# Patient Record
Sex: FEMALE | Race: WHITE | Employment: FULL TIME
[De-identification: names, ages, dates, MRNs, and addresses within clinical notes are randomized per-mention and may not be internally consistent; named-entity substitution may affect disease eponyms.]

---

## 2017-09-03 ENCOUNTER — HEALTH MAINTENANCE LETTER (OUTPATIENT)
Age: 37
End: 2017-09-03

## 2018-02-28 ENCOUNTER — COMMUNICATION - HEALTHEAST (OUTPATIENT)
Dept: BEHAVIORAL HEALTH | Facility: CLINIC | Age: 38
End: 2018-02-28

## 2018-02-28 DIAGNOSIS — F33.2 MAJOR DEPRESSIVE DISORDER, RECURRENT SEVERE WITHOUT PSYCHOTIC FEATURES (H): ICD-10-CM

## 2018-10-01 ENCOUNTER — COMMUNICATION - HEALTHEAST (OUTPATIENT)
Dept: BEHAVIORAL HEALTH | Facility: CLINIC | Age: 38
End: 2018-10-01

## 2018-10-01 DIAGNOSIS — F33.2 MAJOR DEPRESSIVE DISORDER, RECURRENT SEVERE WITHOUT PSYCHOTIC FEATURES (H): ICD-10-CM

## 2019-12-12 ENCOUNTER — APPOINTMENT (OUTPATIENT)
Dept: GENERAL RADIOLOGY | Facility: CLINIC | Age: 39
End: 2019-12-12
Attending: EMERGENCY MEDICINE
Payer: COMMERCIAL

## 2019-12-12 ENCOUNTER — HOSPITAL ENCOUNTER (EMERGENCY)
Facility: CLINIC | Age: 39
Discharge: NURSING, REHAB OR LONG-TERM CARE | End: 2019-12-13
Attending: EMERGENCY MEDICINE | Admitting: EMERGENCY MEDICINE
Payer: COMMERCIAL

## 2019-12-12 DIAGNOSIS — R07.9 ACUTE CHEST PAIN: ICD-10-CM

## 2019-12-12 LAB
ANION GAP SERPL CALCULATED.3IONS-SCNC: 5 MMOL/L (ref 3–14)
BASOPHILS # BLD AUTO: 0 10E9/L (ref 0–0.2)
BASOPHILS NFR BLD AUTO: 0.5 %
BUN SERPL-MCNC: 12 MG/DL (ref 7–30)
CALCIUM SERPL-MCNC: 8.6 MG/DL (ref 8.5–10.1)
CHLORIDE SERPL-SCNC: 108 MMOL/L (ref 94–109)
CO2 SERPL-SCNC: 26 MMOL/L (ref 20–32)
CREAT SERPL-MCNC: 0.94 MG/DL (ref 0.52–1.04)
DIFFERENTIAL METHOD BLD: NORMAL
EOSINOPHIL # BLD AUTO: 0.1 10E9/L (ref 0–0.7)
EOSINOPHIL NFR BLD AUTO: 1.8 %
ERYTHROCYTE [DISTWIDTH] IN BLOOD BY AUTOMATED COUNT: 12.6 % (ref 10–15)
GFR SERPL CREATININE-BSD FRML MDRD: 76 ML/MIN/{1.73_M2}
GLUCOSE SERPL-MCNC: 92 MG/DL (ref 70–99)
HCT VFR BLD AUTO: 42.7 % (ref 35–47)
HGB BLD-MCNC: 13.6 G/DL (ref 11.7–15.7)
IMM GRANULOCYTES # BLD: 0 10E9/L (ref 0–0.4)
IMM GRANULOCYTES NFR BLD: 0.1 %
LYMPHOCYTES # BLD AUTO: 2.1 10E9/L (ref 0.8–5.3)
LYMPHOCYTES NFR BLD AUTO: 26.9 %
MCH RBC QN AUTO: 29 PG (ref 26.5–33)
MCHC RBC AUTO-ENTMCNC: 31.9 G/DL (ref 31.5–36.5)
MCV RBC AUTO: 91 FL (ref 78–100)
MONOCYTES # BLD AUTO: 0.8 10E9/L (ref 0–1.3)
MONOCYTES NFR BLD AUTO: 10.2 %
NEUTROPHILS # BLD AUTO: 4.6 10E9/L (ref 1.6–8.3)
NEUTROPHILS NFR BLD AUTO: 60.5 %
NRBC # BLD AUTO: 0 10*3/UL
NRBC BLD AUTO-RTO: 0 /100
PLATELET # BLD AUTO: 259 10E9/L (ref 150–450)
POTASSIUM SERPL-SCNC: 4.3 MMOL/L (ref 3.4–5.3)
RBC # BLD AUTO: 4.69 10E12/L (ref 3.8–5.2)
SODIUM SERPL-SCNC: 138 MMOL/L (ref 133–144)
TROPONIN I SERPL-MCNC: <0.015 UG/L (ref 0–0.04)
WBC # BLD AUTO: 7.7 10E9/L (ref 4–11)

## 2019-12-12 PROCEDURE — 85025 COMPLETE CBC W/AUTO DIFF WBC: CPT | Performed by: EMERGENCY MEDICINE

## 2019-12-12 PROCEDURE — 84484 ASSAY OF TROPONIN QUANT: CPT | Performed by: EMERGENCY MEDICINE

## 2019-12-12 PROCEDURE — 93005 ELECTROCARDIOGRAM TRACING: CPT | Performed by: EMERGENCY MEDICINE

## 2019-12-12 PROCEDURE — 99285 EMERGENCY DEPT VISIT HI MDM: CPT | Mod: 25 | Performed by: EMERGENCY MEDICINE

## 2019-12-12 PROCEDURE — 93010 ELECTROCARDIOGRAM REPORT: CPT | Mod: Z6 | Performed by: EMERGENCY MEDICINE

## 2019-12-12 PROCEDURE — 80048 BASIC METABOLIC PNL TOTAL CA: CPT | Performed by: EMERGENCY MEDICINE

## 2019-12-12 PROCEDURE — 71046 X-RAY EXAM CHEST 2 VIEWS: CPT

## 2019-12-12 PROCEDURE — 96374 THER/PROPH/DIAG INJ IV PUSH: CPT | Performed by: EMERGENCY MEDICINE

## 2019-12-12 PROCEDURE — 25000128 H RX IP 250 OP 636: Performed by: EMERGENCY MEDICINE

## 2019-12-12 RX ORDER — IBUPROFEN 600 MG/1
600 TABLET, FILM COATED ORAL EVERY 6 HOURS PRN
Qty: 30 TABLET | Refills: 0 | Status: SHIPPED | OUTPATIENT
Start: 2019-12-12 | End: 2020-01-23

## 2019-12-12 RX ORDER — KETOROLAC TROMETHAMINE 30 MG/ML
30 INJECTION, SOLUTION INTRAMUSCULAR; INTRAVENOUS ONCE
Status: COMPLETED | OUTPATIENT
Start: 2019-12-12 | End: 2019-12-12

## 2019-12-12 RX ORDER — CYCLOBENZAPRINE HCL 10 MG
10 TABLET ORAL 3 TIMES DAILY PRN
Qty: 20 TABLET | Refills: 0 | Status: SHIPPED | OUTPATIENT
Start: 2019-12-12 | End: 2020-01-23

## 2019-12-12 RX ADMIN — KETOROLAC TROMETHAMINE 30 MG: 30 INJECTION, SOLUTION INTRAMUSCULAR; INTRAVENOUS at 23:12

## 2019-12-12 NOTE — ED AVS SNAPSHOT
[facility], [place], Emergency Department  [address] [address]  Phone:  [phone]                                    Kelly Beth Behlen   MRN: [id number]    Department:  [facility], Emergency Department   Date of Visit:  12/12/2019           After Visit Summary Signature Page    I have received my discharge instructions, and my questions have been answered. I have discussed any challenges I see with this plan with the nurse or doctor.    ..........................................................................................................................................  Patient/Patient Representative Signature      ..........................................................................................................................................  Patient Representative Print Name and Relationship to Patient    ..................................................               ................................................  Date                                   Time    ..........................................................................................................................................  Reviewed by Signature/Title    ...................................................              ..............................................  Date                                               Time          22EPIC Rev 08/18

## 2019-12-13 VITALS
DIASTOLIC BLOOD PRESSURE: 67 MMHG | OXYGEN SATURATION: 98 % | HEART RATE: 62 BPM | SYSTOLIC BLOOD PRESSURE: 116 MMHG | RESPIRATION RATE: 13 BRPM | TEMPERATURE: 98.1 F

## 2019-12-13 LAB
INTERPRETATION ECG - MUSE: NORMAL
TROPONIN I SERPL-MCNC: <0.015 UG/L (ref 0–0.04)

## 2019-12-13 NOTE — ED PROVIDER NOTES
"      [place] EMERGENCY DEPARTMENT ([facility])  December 12, 2019    History     Chief Complaint   Patient presents with     Chest Pain     HPI  Kelly Beth Behlen is a 39 year old female with past medical history of anxiety and depression who presents the Emergency Department with chief complaint of intermittent chest pain that started within the last hour.  It is in the midsternal region.  It is described as a pressure.  It is 4 out of 10 in severity.  Patient states the pain gets \"two times worse\" when she stands up.  It radiates into her jaw and clavicle on the R.  She denies pain to the touch or with deep breaths. Patient denies previous history of heart problems or DVT/PE. She is unsure of family history of heart problems. She notes she had an EKG on 12/2/19 before starting [facility] (she had been restricting her calorie intake to 600 calories per day for the past 2.5 months), at which time she was told she has a prolonged QT. She denies swelling in her bilateral lower extremities.  Patient reports she has a cough that is associated with her chronic asthma. She notes she may have bronchitis and also has been diagnosed with left lower lobe atelectasis in the past. Patient does not smoke cigarettes.   No known family history of coronary artery disease, although the patient states she is not fully aware of her family medical history.  She denies a personal history of hypertension, hyperlipidemia, diabetes, or cardiac disease.  Denies leg pain or swelling.    I have reviewed the Medications, Allergies, Past Medical and Surgical History, and Social History in the [employer] system.  Past Medical History:   Diagnosis Date     Anxiety      Depressive disorder      History reviewed. No pertinent surgical history.  No current facility-administered medications for this encounter.      Current Outpatient Medications   Medication     ESTRADIOL PO     FLUoxetine (PROZAC) 20 MG capsule     OLANZapine (ZYPREXA) 10 " MG tablet     Progesterone Micronized (PROMETRIUM PO)     SPIRONOLACTONE PO      No Known Allergies  Social History     Socioeconomic History     Marital status:      Spouse name: Not on file     Number of children: Not on file     Years of education: Not on file     Highest education level: Not on file   Occupational History     Not on file   Social Needs     Financial resource strain: Not on file     Food insecurity:     Worry: Not on file     Inability: Not on file     Transportation needs:     Medical: Not on file     Non-medical: Not on file   Tobacco Use     Smoking status: Never Smoker   Substance and Sexual Activity     Alcohol use: No     Drug use: Not on file     Sexual activity: Not on file   Lifestyle     Physical activity:     Days per week: Not on file     Minutes per session: Not on file     Stress: Not on file   Relationships     Social connections:     Talks on phone: Not on file     Gets together: Not on file     Attends Spiritism service: Not on file     Active member of club or organization: Not on file     Attends meetings of clubs or organizations: Not on file     Relationship status: Not on file     Intimate partner violence:     Fear of current or ex partner: Not on file     Emotionally abused: Not on file     Physically abused: Not on file     Forced sexual activity: Not on file   Other Topics Concern     Not on file   Social History Narrative     Not on file       Review of Systems  General: No fevers or chills  Skin: No rash or diaphoresis  Eyes: No eye redness or discharge  Ears/Nose/Throat: No rhinorrhea or nasal congestion  Respiratory: No cough or SOB  Cardiovascular: No chest pain or palpitations  Gastrointestinal: No nausea, vomiting, or diarrhea  Genitourinary: No urinary frequency, hematuria, or dysuria  Musculoskeletal: No arthralgias or myalgias  Neurologic: No numbness or weakness  Psychiatric: No depression or SI  Hematologic/Lymphatic/Immunologic: No leg swelling, no  easy bruising/bleeding  Endocrine: No polyuria/polydypsia      Physical Exam   BP: 128/67  Pulse: 69  SpO2: 95 %      Physical Exam  General: Well nourished, well developed, NAD  HEENT: EOMI, anicteric. NCAT, MMM  Neck: no jugular venous distension, supple, nl ROM  Cardiac: Regular rate and rhythm. No murmurs, rubs, or gallops. Normal S1, S2.  Intact peripheral pulses  Pulm: CTAB, no stridor, wheezes, rales, rhonchi  Abd: Soft, obese, nontender, nondistended.  No masses palpated.    Skin: Warm and dry to the touch.  No rash  Extremities: No LE edema, no cyanosis, w/w/p, no posterior calf tenderness  Neuro: A&Ox3, no gross focal deficits        ED Course        Procedures             EKG Interpretation:      Interpreted by Aisha Davis MD  Time reviewed: 2114  Symptoms at time of EKG: Chest pain  Rhythm: normal sinus   Rate: normal  Axis: normal  Ectopy: none  Conduction: normal  ST Segments/ T Waves: No ST-T wave changes  Q Waves: none  Comparison to prior: No old EKG available    Clinical Impression: normal EKG          Critical Care time:  none             Labs Ordered and Resulted from Time of ED Arrival Up to the Time of Departure from the ED   CBC WITH PLATELETS DIFFERENTIAL   BASIC METABOLIC PANEL   TROPONIN I   TROPONIN I   CARDIAC CONTINUOUS MONITORING          Results for orders placed or performed during the hospital encounter of 12/12/19 (from the past 24 hour(s))   EKG 12-lead, tracing only   Result Value Ref Range    Interpretation ECG Click View Image link to view waveform and result    XR Chest 2 Views    Narrative    Exam: XR CHEST 2 VW, 12/12/2019 9:23 PM    Indication: soa    Comparison: None    Findings:   PA and lateral views the chest. Cardiac silhouette and pulmonary  vasculature are within normal limits. No pneumothorax or pleural  effusion. No focal airspace opacities. No acute bony abnormalities.  The upper abdomen is unremarkable. Cholecystectomy surgical clips  project over the  right upper quadrant.      Impression    Impression:   No acute airspace disease.    I have personally reviewed the examination and initial interpretation  and I agree with the findings.    DEON FLYNN MD   CBC with platelets differential   Result Value Ref Range    WBC 7.7 4.0 - 11.0 10e9/L    RBC Count 4.69 3.8 - 5.2 10e12/L    Hemoglobin 13.6 11.7 - 15.7 g/dL    Hematocrit 42.7 35.0 - 47.0 %    MCV 91 78 - 100 fl    MCH 29.0 26.5 - 33.0 pg    MCHC 31.9 31.5 - 36.5 g/dL    RDW 12.6 10.0 - 15.0 %    Platelet Count 259 150 - 450 10e9/L    Diff Method Automated Method     % Neutrophils 60.5 %    % Lymphocytes 26.9 %    % Monocytes 10.2 %    % Eosinophils 1.8 %    % Basophils 0.5 %    % Immature Granulocytes 0.1 %    Nucleated RBCs 0 0 /100    Absolute Neutrophil 4.6 1.6 - 8.3 10e9/L    Absolute Lymphocytes 2.1 0.8 - 5.3 10e9/L    Absolute Monocytes 0.8 0.0 - 1.3 10e9/L    Absolute Eosinophils 0.1 0.0 - 0.7 10e9/L    Absolute Basophils 0.0 0.0 - 0.2 10e9/L    Abs Immature Granulocytes 0.0 0 - 0.4 10e9/L    Absolute Nucleated RBC 0.0    Basic metabolic panel   Result Value Ref Range    Sodium 138 133 - 144 mmol/L    Potassium 4.3 3.4 - 5.3 mmol/L    Chloride 108 94 - 109 mmol/L    Carbon Dioxide 26 20 - 32 mmol/L    Anion Gap 5 3 - 14 mmol/L    Glucose 92 70 - 99 mg/dL    Urea Nitrogen 12 7 - 30 mg/dL    Creatinine 0.94 0.52 - 1.04 mg/dL    GFR Estimate 76 >60 mL/min/[1.73_m2]    GFR Estimate If Black 88 >60 mL/min/[1.73_m2]    Calcium 8.6 8.5 - 10.1 mg/dL   Troponin I   Result Value Ref Range    Troponin I ES <0.015 0.000 - 0.045 ug/L   Troponin I (second draw)   Result Value Ref Range    Troponin I ES <0.015 0.000 - 0.045 ug/L       Labs, vital signs, and imaging studies were reviewed by me.    Assessments & Plan (with Medical Decision Making)   Patient is a 39-year-old female presenting with chest pain.  Differential diagnosis includes chest wall pain/costochondritis, pneumonia, bronchitis.  ACS is less  likely given heart score of 2 (moderately suspicious story, one risk factor including obesity), however EKG and troponin levels ordered to further evaluate for this.  PE is unlikely as patient is PERC negative.    Labs are within normal limits including troponin x2.  Chest x-ray is negative and EKG is unremarkable    I have reviewed the nursing notes.    I have reviewed the findings, diagnosis, plan and need for follow up with the patient.  Patient to be discharged home. Advised to follow up with PCP within 1 week. To return to ER immediately with any new/worsening symptoms. Plan of care discussed with patient who expresses understanding and agrees with plan of care.  Patient provided with Flexeril and ibuprofen for pain control at home.      New Prescriptions    CYCLOBENZAPRINE (FLEXERIL) 10 MG TABLET    Take 1 tablet (10 mg) by mouth 3 times daily as needed for muscle spasms    IBUPROFEN (ADVIL/MOTRIN) 600 MG TABLET    Take 1 tablet (600 mg) by mouth every 6 hours as needed       Final diagnoses:   Acute chest pain     ICorina, am serving as a trained medical scribe to document services personally performed by Aisha Davis MD, based on the provider's statements to me.      IAisha MD, was physically present and have reviewed and verified the accuracy of this note documented by Corina Irizarry.       12/12/2019   [facility], [place], EMERGENCY DEPARTMENT     Aisha Davis MD  12/13/19 0043

## 2019-12-13 NOTE — ED NOTES
Bed: ED23  Expected date:   Expected time:   Means of arrival: Ambulance  Comments:  SP23   39F with chest pain from the [facility] [facility]

## 2019-12-13 NOTE — DISCHARGE INSTRUCTIONS
TODAY'S VISIT:  You were seen today for chest pain  -   - If you had any labs or imaging/radiology tests performed today, you should also discuss these tests with your usual provider.     FOLLOW-UP:  Please make an appointment to follow up with:  - Your Primary Care Provider. If you do not have a PCP, please call the Primary Care Center (phone: [phone] for an appointment    - Have your provider review the results from today's visit with you again to make sure no further follow-up or additional testing is needed based on those results.     PRESCRIPTIONS / MEDICATIONS:  - ibuprofen  - flexeril    RETURN TO THE EMERGENCY DEPARTMENT  Return to the Emergency Department at any time for any new or worsening symptoms or any concerns.

## 2019-12-13 NOTE — ED TRIAGE NOTES
Per spF PATIENT FROM [facility] midsternal radiating pressure 4/10 worse with movement radiating into jaw and clavicle. Patient states it started within last hour

## 2020-01-23 ENCOUNTER — TELEPHONE (OUTPATIENT)
Dept: BEHAVIORAL HEALTH | Facility: CLINIC | Age: 40
End: 2020-01-23

## 2020-01-23 ENCOUNTER — HOSPITAL ENCOUNTER (INPATIENT)
Facility: CLINIC | Age: 40
LOS: 4 days | Discharge: HOME | DRG: 883 | End: 2020-01-27
Attending: EMERGENCY MEDICINE | Admitting: PSYCHIATRY & NEUROLOGY
Payer: COMMERCIAL

## 2020-01-23 DIAGNOSIS — F33.9 EPISODE OF RECURRENT MAJOR DEPRESSIVE DISORDER, UNSPECIFIED DEPRESSION EPISODE SEVERITY (H): ICD-10-CM

## 2020-01-23 DIAGNOSIS — F32.A DEPRESSION, UNSPECIFIED DEPRESSION TYPE: ICD-10-CM

## 2020-01-23 DIAGNOSIS — F41.9 ANXIETY: ICD-10-CM

## 2020-01-23 DIAGNOSIS — R45.851 SUICIDAL IDEATION: ICD-10-CM

## 2020-01-23 DIAGNOSIS — E55.9 VITAMIN D DEFICIENCY: Primary | ICD-10-CM

## 2020-01-23 DIAGNOSIS — Z78.9 TRANSGENDER: ICD-10-CM

## 2020-01-23 LAB
AMPHETAMINES UR QL SCN: NEGATIVE
BARBITURATES UR QL: NEGATIVE
BENZODIAZ UR QL: NEGATIVE
CANNABINOIDS UR QL SCN: NEGATIVE
COCAINE UR QL: NEGATIVE
ETHANOL UR QL SCN: NEGATIVE
OPIATES UR QL SCN: NEGATIVE

## 2020-01-23 PROCEDURE — 99285 EMERGENCY DEPT VISIT HI MDM: CPT | Mod: 25 | Performed by: EMERGENCY MEDICINE

## 2020-01-23 PROCEDURE — 90791 PSYCH DIAGNOSTIC EVALUATION: CPT

## 2020-01-23 PROCEDURE — 80307 DRUG TEST PRSMV CHEM ANLYZR: CPT | Performed by: FAMILY MEDICINE

## 2020-01-23 PROCEDURE — 80320 DRUG SCREEN QUANTALCOHOLS: CPT | Performed by: FAMILY MEDICINE

## 2020-01-23 PROCEDURE — 25000132 ZZH RX MED GY IP 250 OP 250 PS 637: Performed by: STUDENT IN AN ORGANIZED HEALTH CARE EDUCATION/TRAINING PROGRAM

## 2020-01-23 PROCEDURE — 12400001 ZZH R&B MH UMMC

## 2020-01-23 PROCEDURE — 90853 GROUP PSYCHOTHERAPY: CPT

## 2020-01-23 PROCEDURE — 99285 EMERGENCY DEPT VISIT HI MDM: CPT | Mod: Z6 | Performed by: EMERGENCY MEDICINE

## 2020-01-23 PROCEDURE — 25000132 ZZH RX MED GY IP 250 OP 250 PS 637: Performed by: FAMILY MEDICINE

## 2020-01-23 RX ORDER — ESCITALOPRAM OXALATE 10 MG/1
30 TABLET ORAL DAILY
Status: ON HOLD | COMMUNITY
Start: 2019-07-08 | End: 2020-01-27

## 2020-01-23 RX ORDER — TRAZODONE HYDROCHLORIDE 50 MG/1
50 TABLET, FILM COATED ORAL
Status: DISCONTINUED | OUTPATIENT
Start: 2020-01-23 | End: 2020-01-27 | Stop reason: HOSPADM

## 2020-01-23 RX ORDER — OLANZAPINE 10 MG/1
10 TABLET ORAL
Status: DISCONTINUED | OUTPATIENT
Start: 2020-01-23 | End: 2020-01-27 | Stop reason: HOSPADM

## 2020-01-23 RX ORDER — LITHIUM CARBONATE 300 MG/1
1200 TABLET, FILM COATED, EXTENDED RELEASE ORAL AT BEDTIME
Status: ON HOLD | COMMUNITY
Start: 2019-09-16 | End: 2020-01-27

## 2020-01-23 RX ORDER — SPIRONOLACTONE 100 MG/1
200 TABLET, FILM COATED ORAL DAILY
Status: DISCONTINUED | OUTPATIENT
Start: 2020-01-23 | End: 2020-01-27 | Stop reason: HOSPADM

## 2020-01-23 RX ORDER — ESTRADIOL 2 MG/1
2 TABLET ORAL 3 TIMES DAILY
Status: DISCONTINUED | OUTPATIENT
Start: 2020-01-23 | End: 2020-01-27 | Stop reason: HOSPADM

## 2020-01-23 RX ORDER — CLONAZEPAM 0.5 MG/1
0.75 TABLET ORAL 3 TIMES DAILY
Status: ON HOLD | COMMUNITY
End: 2020-01-27

## 2020-01-23 RX ORDER — CLONAZEPAM 0.5 MG/1
0.75 TABLET ORAL 3 TIMES DAILY
COMMUNITY
Start: 2019-07-08 | End: 2020-01-23

## 2020-01-23 RX ORDER — ACETAMINOPHEN 325 MG/1
650 TABLET ORAL EVERY 4 HOURS PRN
Status: DISCONTINUED | OUTPATIENT
Start: 2020-01-23 | End: 2020-01-27 | Stop reason: HOSPADM

## 2020-01-23 RX ORDER — MULTIVITAMIN,THERAPEUTIC
1 TABLET ORAL DAILY
Status: DISCONTINUED | OUTPATIENT
Start: 2020-01-23 | End: 2020-01-24

## 2020-01-23 RX ORDER — MULTIPLE VITAMINS W/ MINERALS TAB 9MG-400MCG
1 TAB ORAL DAILY
COMMUNITY
End: 2020-12-27

## 2020-01-23 RX ORDER — HYDROXYZINE HYDROCHLORIDE 25 MG/1
25 TABLET, FILM COATED ORAL EVERY 4 HOURS PRN
Status: DISCONTINUED | OUTPATIENT
Start: 2020-01-23 | End: 2020-01-27 | Stop reason: HOSPADM

## 2020-01-23 RX ORDER — METAXALONE 800 MG/1
800 TABLET ORAL 3 TIMES DAILY PRN
Status: DISCONTINUED | OUTPATIENT
Start: 2020-01-23 | End: 2020-01-27 | Stop reason: HOSPADM

## 2020-01-23 RX ORDER — ERGOCALCIFEROL 1.25 MG/1
50000 CAPSULE, LIQUID FILLED ORAL WEEKLY
Status: ON HOLD | COMMUNITY
End: 2020-01-27

## 2020-01-23 RX ORDER — OLANZAPINE 10 MG/2ML
10 INJECTION, POWDER, FOR SOLUTION INTRAMUSCULAR
Status: DISCONTINUED | OUTPATIENT
Start: 2020-01-23 | End: 2020-01-27 | Stop reason: HOSPADM

## 2020-01-23 RX ORDER — METAXALONE 800 MG/1
800 TABLET ORAL 3 TIMES DAILY PRN
COMMUNITY

## 2020-01-23 RX ORDER — ACETAMINOPHEN 325 MG/1
975 TABLET ORAL ONCE
Status: COMPLETED | OUTPATIENT
Start: 2020-01-23 | End: 2020-01-23

## 2020-01-23 RX ORDER — LIDOCAINE 4 G/G
1-3 PATCH TOPICAL DAILY PRN
COMMUNITY

## 2020-01-23 RX ADMIN — THERA TABS 1 TABLET: TAB at 21:29

## 2020-01-23 RX ADMIN — Medication 0.75 MG: at 21:28

## 2020-01-23 RX ADMIN — SPIRONOLACTONE 200 MG: 100 TABLET ORAL at 21:29

## 2020-01-23 RX ADMIN — PROGESTERONE 100 MG: 100 CAPSULE ORAL at 21:29

## 2020-01-23 RX ADMIN — ACETAMINOPHEN 975 MG: 325 TABLET, FILM COATED ORAL at 12:51

## 2020-01-23 RX ADMIN — LITHIUM CARBONATE 1200 MG: 450 TABLET, EXTENDED RELEASE ORAL at 21:29

## 2020-01-23 RX ADMIN — ESTRADIOL 2 MG: 2 TABLET ORAL at 21:29

## 2020-01-23 ASSESSMENT — ACTIVITIES OF DAILY LIVING (ADL)
LAUNDRY: WITH SUPERVISION
DRESS: INDEPENDENT
HYGIENE/GROOMING: INDEPENDENT
ORAL_HYGIENE: INDEPENDENT

## 2020-01-23 ASSESSMENT — ENCOUNTER SYMPTOMS
NECK STIFFNESS: 0
FATIGUE: 1
SORE THROAT: 0
CONFUSION: 0
DYSPHORIC MOOD: 1
NECK PAIN: 0
SLEEP DISTURBANCE: 1
VOMITING: 0
CHILLS: 0
HEADACHES: 0
DYSURIA: 0
NAUSEA: 0
FEVER: 0
SHORTNESS OF BREATH: 0

## 2020-01-23 NOTE — TELEPHONE ENCOUNTER
Patient cleared and ready for behavioral bed placement: Yes     S: DEC  called to give clinical on a 38 y/o female in the [facility] ED presenting with SI.    B: Hx depression, anxiety, eating d/o, SIB.  Pt is M->F transgender.  SI with multiple plans to drive her car off the road as fast as she can as well as to pocket a bullet and go into a gun supply store and use a gun or get killed while being robbed.  Pt has a substantial amount of cash on hand.  Pt's friend  about a month ago and she recently failed OP treatment with [facility].  Pt reported she was too anxious and did not go for the sessions.  Hx of suicide attempts via drug overdose and a high speed alber.    A: Voluntary  No medical concerns  Drug screen has not been collected at this time.    R: Placed on waitlist.  DEC notified.

## 2020-01-23 NOTE — ED NOTES
Writer heard banging coming from patient room. Writer entered room to check on patient and found patient lying on the floor with a pillow case wrapped around her neck. The pillow case had been ripped, and tied in a noose like fashion, and it appears that the patient had been twisting and pulling on it. Patient was semi responsive, and her face was blue. Writer immediately removed the noose from around that patients neck and called a MD. Patient had pulse and was breathing spontaneously without intervention.

## 2020-01-23 NOTE — TELEPHONE ENCOUNTER
Update: [facility] ED MD reporting that patient attempted to strangle themselves while in the ED by ripping a pillow case up, ED reports no medical intervention was needed as it was caught by staff in time. Pt. Placed on an emergency hold and has 1:1 staffing.

## 2020-01-23 NOTE — PHARMACY-ADMISSION MEDICATION HISTORY
Admission medication history for the January 23, 2020 admission is complete.     Interview sources:  patient, Daryn, [facility] [phone]    Reliability of source: good - patient knew names of medications and most doses - confirmed with [employer], confirmed clonazepam dosing with [facility]    Medication compliance: good per pt report    Changes made to PTA medication list (reason)  Added:   - multivitamin daily (per pt)  - ergocalciferol 50,000 units weekly (prescribed to patient, but hasn't taken since she left the [employer] [facility] a few weeks ago)  - metaxalone 800 mg TID PRN (per pt, confirmed with [employer])  Deleted:   - olanzapine 10 mg HS (Rx from 11/13/19 - written for 14 day supply, pt does not think this is a current medication)  - ibuprofen 600 mg q6h PRN (not taking)  Changed:   - escitalopram 10 mg daily-->30 mg daily (per [employer])    Additional medication history information:   - Lithium dose not confirmed with pharmacy. Available records all show last dose as 600 mg at bedtime (last filled 12/9/19). Patient said that her lithium dose was increased twice while she was at the [facility] in December 2019, and was confident that her current dose is lithium 1200 mg HS.  - Patient says that she typically takes all medications once daily (combines meds that are scheduled BID-TID). This affects prescribed dosing on: spironolactone, estradiol, clonazepam.    Per [place] :  - clonazepam 0.5 mg tabs #68 for 15 days supply last filled 12/30/19    Prior to Admission Medications   Prescriptions Last Dose Informant Patient Reported? Taking?   Lidocaine (LIDOCARE) 4 % Patch PRN  Yes Yes   Sig: Place 1-3 patches onto the skin daily as needed for moderate pain To prevent lidocaine toxicity, patient should be patch free for 12 hrs daily.   clonazePAM (KLONOPIN) 0.5 MG tablet 1/22/2020 at PM  Yes Yes   Sig: Take 0.75 mg by mouth 3 times daily   escitalopram (LEXAPRO) 10 MG tablet 1/22/2020   Yes Yes   Sig: Take 30 mg by mouth daily    estradiol (ESTRACE) 2 MG tablet 1/22/2020  Yes Yes   Sig: Take 2 mg by mouth 3 times daily    lithium ER (LITHOBID/ESKALITH CR) 300 MG CR tablet 1/22/2020  Yes Yes   Sig: Take 1,200 mg by mouth At Bedtime   metaxalone (SKELAXIN) 800 MG tablet PRN  Yes Yes   Sig: Take 800 mg by mouth 3 times daily as needed (muscle spasm)   multivitamin w/minerals (THERA-VIT-M) tablet 1/22/2020  Yes Yes   Sig: Take 1 tablet by mouth daily   progesterone (PROMETRIUM) 100 MG capsule 1/22/2020  Yes Yes   Sig: Take 100 mg by mouth daily    spironolactone (ALDACTONE) 100 MG tablet 1/22/2020  Yes Yes   Sig: Take 100 mg by mouth 2 times daily    vitamin D2 (ERGOCALCIFEROL) 34885 units (1250 mcg) capsule Past Month  Yes Yes   Sig: Take 50,000 Units by mouth once a week      Facility-Administered Medications: None       Time spent: 40 minutes    Medication history completed by:   Jerel Johnson, PharmD  [facility]  Emergency Department: Ascom *[id number]

## 2020-01-23 NOTE — ED PROVIDER NOTES
"  History     Chief Complaint   Patient presents with     Suicidal     SI with plan to \"Drive my car off the road as fast as I could\"     HPI  Kelly Beth Behlen is a 39 year old female who has a PMHx of anxiety and depression presenting with SI and plan to drive car off of the road. Patient states that she is not contributing to society. She states that in the past the only time that her pain is relieved was after overdose of trazodone. Patient states she does not want to wake up, she wishes she could overdose and not wake up. Patient discharged a week ago from [place] program to outpatient program, she states that she was too anxious to do this and did not show up. Denies HI, hallucinations and voices.     I have reviewed the Medications, Allergies, Past Medical and Surgical History, and Social History in the Epic system.    Review of Systems   Constitutional: Positive for fatigue. Negative for chills and fever.   HENT: Negative for sore throat.    Respiratory: Negative for shortness of breath.    Cardiovascular: Negative for chest pain.   Gastrointestinal: Negative for nausea and vomiting.   Genitourinary: Negative for dysuria.   Musculoskeletal: Negative for neck pain and neck stiffness.   Neurological: Negative for headaches.   Psychiatric/Behavioral: Positive for dysphoric mood, sleep disturbance and suicidal ideas. Negative for behavioral problems, confusion and self-injury.   All other systems reviewed and are negative.      Physical Exam   BP: 127/77  Pulse: 79  Temp: 98.4  F (36.9  C)  Resp: 18  Height: 190.5 cm (6' 3\")  Weight: (pt refused)  SpO2: 99 %      Physical Exam  Physical Exam   Constitutional: oriented to person, place, and time. appears well-developed and well-nourished.   HENT:   Head: Normocephalic and atraumatic.   Neck: Normal range of motion.   Pulmonary/Chest: Effort normal. No respiratory distress.   Cardiac: No murmurs, rubs, gallops. RRR.  Abdominal: Abdomen soft, nontender, nondistended. " No rebound tenderness.  MSK: Long bones without deformity or evidence of trauma. Old linear scars and superficial excoriation to the LLE.  Neurological: alert and oriented to person, place, and time.   Skin: Skin is warm and dry.   Psychiatric: poor eye contact. Flat affect.     ED Course        Procedures          Labs Ordered and Resulted from Time of ED Arrival Up to the Time of Departure from the ED - No data to display         Assessments & Plan (with Medical Decision Making)   MDM  Patient presenting with SI and plan. She has multiple plans and has a hx of this in the past. She cannot contract for safety. Plan for admission for psychiatric stabilization. She has evidence of old cutting on the leg, no lacerations to repair.    I have reviewed the nursing notes.    I have reviewed the findings, diagnosis, plan and need for follow up with the patient.    New Prescriptions    No medications on file       Final diagnoses:   Suicidal ideation   Depression, unspecified depression type       1/23/2020   [facility], [place], EMERGENCY DEPARTMENT     Tomás lBake MD  01/23/20 0323       Tomás Blake MD  01/23/20 0331

## 2020-01-23 NOTE — ED NOTES
"Pt was reminded to provide urine for analysis. Patient replied \"whenever that happened I'm pretty dehydrated. It could be for a while.\"  "
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
## 2020-01-23 NOTE — TELEPHONE ENCOUNTER
Patient cleared and ready for behavioral bed placement: Yes     R:  Admit to 20    accepts for himself   Will be on a 72 hr hold      -  Teresa Bearden, chrystal  11:30 am  -  ED texted and called    Awaiting DC

## 2020-01-23 NOTE — ED NOTES
"ED to Behavioral Floor Handoff    SITUATION  Kelly Beth Behlen is a 39 year old female who speaks English and lives in a home fiance. The patient arrived in the ED by private car from home with a complaint of Suicidal (SI with plan to \"Drive my car off the road as fast as I could\")  .The patient's current symptoms started/worsened- Patient stated \"I don't know I don't count them\"  Final diagnoses:   Suicidal ideation   Depression, unspecified depression type        Initial vitals were: BP: 127/77  Pulse: 79  Temp: 98.4  F (36.9  C)  Resp: 18  Height: 190.5 cm (6' 3\")  Weight: (pt refused)  SpO2: 99 %   --------  Is the patient diabetic? No   If yes, last blood glucose? --     If yes, was this treated in the ED? --  --------  Is the patient inebriated (ETOH) No or Impaired on other substances? No  MSSA done? No  Last MSSA score: --    Were withdrawal symptoms treated? No  Does the patient have a seizure history? No. If yes, date of most recent seizure--  --------  Is the patient patient experiencing suicidal ideation? reports occasional suicidal thoughts representing feeling that life is not worth feeling    Homicidal ideation? denies current or recent homicidal ideation or behaviors.    Self-injurious behavior/urges? reports current or recent self injurious behavior or ideation including last cutting was 2 weeks ago - left shin.  ------  Was pt aggressive in the ED No  Was a code called No  Is the pt now cooperative? Yes  -------  Meds given in ED: Medications - No data to display   Family present during ED course? No  Family currently present? No    BACKGROUND  Does the patient have a cognitive impairment or developmental disability? No  Allergies: No Known Allergies.   Social demographics are   Social History     Socioeconomic History     Marital status:      Spouse name: None     Number of children: None     Years of education: None     Highest education level: None   Occupational History     None " "  Social Needs     Financial resource strain: None     Food insecurity:     Worry: None     Inability: None     Transportation needs:     Medical: None     Non-medical: None   Tobacco Use     Smoking status: Never Smoker     Smokeless tobacco: Never Used   Substance and Sexual Activity     Alcohol use: No     Drug use: None     Sexual activity: None   Lifestyle     Physical activity:     Days per week: None     Minutes per session: None     Stress: None   Relationships     Social connections:     Talks on phone: None     Gets together: None     Attends Hoahaoism service: None     Active member of club or organization: None     Attends meetings of clubs or organizations: None     Relationship status: None     Intimate partner violence:     Fear of current or ex partner: None     Emotionally abused: None     Physically abused: None     Forced sexual activity: None   Other Topics Concern     None   Social History Narrative     None        ASSESSMENT  Labs results Labs Ordered and Resulted from Time of ED Arrival Up to the Time of Departure from the ED - No data to display   Imaging Studies: No results found for this or any previous visit (from the past 24 hour(s)).   Most recent vital signs /77   Pulse 79   Temp 98.4  F (36.9  C) (Oral)   Resp 18   Ht 1.905 m (6' 3\")   SpO2 99%    Abnormal labs/tests/findings requiring intervention:---   Pain control: pt had none  Nausea control: pt had none    RECOMMENDATION  Are any infection precautions needed (MRSA, VRE, etc.)? No If yes, what infection? --  ---  Does the patient have mobility issues? independently. If yes, what device does the pt use? ---  ---  Is patient on 72 hour hold or commitment? No If on 72 hour hold, have hold and rights been given to patient? No  Are admitting orders written if after 10 p.m. ?No  Tasks needing to be completed:---     Matthew Vuong RN   ascom--    [phone] [facility]   [phone]  "

## 2020-01-23 NOTE — ED NOTES
"     Emergency Department Patient Sign-out       Brief HPI:  This is a 39 year old female signed out to me by Dr. Blake .  See initial ED Provider note for details of the presentation.          Patient is medically cleared for admission to a Behavioral Health unit.      The patient is not on a hold.      The patient has not required medication for agitation.    Awaiting transfer to psychiatric unit.        Significant Events prior to my assuming care: none      Exam:   Patient Vitals for the past 24 hrs:   BP Temp Temp src Pulse Resp SpO2 Height Weight   01/23/20 0232 127/77 98.4  F (36.9  C) Oral 79 18 99 % 1.905 m (6' 3\") --           ED RESULTS:   No results found for this visit on 01/23/20 (from the past 24 hour(s)).    ED MEDICATIONS:   Medications - No data to display      Impression:    ICD-10-CM    1. Suicidal ideation R45.851    2. Depression, unspecified depression type F32.9        Plan:    Pending studies include none.     I was informed by security that patient attempted to hang herself.  Patient tore the pillowcase, wrapped around her neck, and kept tightening the pillowcase.  The nurse heard rambling from patient's room, opened the door and found her on the floor with the pillowcase around her neck.  By the time I arrived patient was slightly pale, but had a pulse and was breathing normally.  No obvious trauma to patient's neck.  Patient regained consciousness within 5 to 10 seconds.  At this time she does not need any further medical or traumatic evaluation.  She will be placed on 72-hour hold and intake was informed about this.      MD Stephanie Blackman Nikola, MD  01/23/20 0945    "

## 2020-01-24 LAB
ALBUMIN SERPL-MCNC: 3.3 G/DL (ref 3.4–5)
ALP SERPL-CCNC: 76 U/L (ref 40–150)
ALT SERPL W P-5'-P-CCNC: 43 U/L (ref 0–50)
ANION GAP SERPL CALCULATED.3IONS-SCNC: 8 MMOL/L (ref 3–14)
AST SERPL W P-5'-P-CCNC: 17 U/L (ref 0–45)
BASOPHILS # BLD AUTO: 0 10E9/L (ref 0–0.2)
BASOPHILS NFR BLD AUTO: 0.1 %
BILIRUB SERPL-MCNC: 0.4 MG/DL (ref 0.2–1.3)
BUN SERPL-MCNC: 16 MG/DL (ref 7–30)
CALCIUM SERPL-MCNC: 8.3 MG/DL (ref 8.5–10.1)
CHLORIDE SERPL-SCNC: 110 MMOL/L (ref 94–109)
CO2 SERPL-SCNC: 23 MMOL/L (ref 20–32)
CREAT SERPL-MCNC: 0.88 MG/DL (ref 0.52–1.04)
DIFFERENTIAL METHOD BLD: NORMAL
EOSINOPHIL # BLD AUTO: 0.3 10E9/L (ref 0–0.7)
EOSINOPHIL NFR BLD AUTO: 3.5 %
ERYTHROCYTE [DISTWIDTH] IN BLOOD BY AUTOMATED COUNT: 12.9 % (ref 10–15)
GFR SERPL CREATININE-BSD FRML MDRD: 82 ML/MIN/{1.73_M2}
GLUCOSE SERPL-MCNC: 97 MG/DL (ref 70–99)
HCT VFR BLD AUTO: 41.1 % (ref 35–47)
HGB BLD-MCNC: 13.6 G/DL (ref 11.7–15.7)
IMM GRANULOCYTES # BLD: 0 10E9/L (ref 0–0.4)
IMM GRANULOCYTES NFR BLD: 0.1 %
LITHIUM SERPL-SCNC: 0.63 MMOL/L (ref 0.6–1.2)
LYMPHOCYTES # BLD AUTO: 2 10E9/L (ref 0.8–5.3)
LYMPHOCYTES NFR BLD AUTO: 26.8 %
MAGNESIUM SERPL-MCNC: 2.2 MG/DL (ref 1.6–2.3)
MCH RBC QN AUTO: 30.4 PG (ref 26.5–33)
MCHC RBC AUTO-ENTMCNC: 33.1 G/DL (ref 31.5–36.5)
MCV RBC AUTO: 92 FL (ref 78–100)
MONOCYTES # BLD AUTO: 0.8 10E9/L (ref 0–1.3)
MONOCYTES NFR BLD AUTO: 10.4 %
NEUTROPHILS # BLD AUTO: 4.4 10E9/L (ref 1.6–8.3)
NEUTROPHILS NFR BLD AUTO: 59.1 %
NRBC # BLD AUTO: 0 10*3/UL
NRBC BLD AUTO-RTO: 0 /100
PHOSPHATE SERPL-MCNC: 3.2 MG/DL (ref 2.5–4.5)
PLATELET # BLD AUTO: 257 10E9/L (ref 150–450)
POTASSIUM SERPL-SCNC: 3.5 MMOL/L (ref 3.4–5.3)
PROT SERPL-MCNC: 6.6 G/DL (ref 6.8–8.8)
RBC # BLD AUTO: 4.47 10E12/L (ref 3.8–5.2)
SODIUM SERPL-SCNC: 141 MMOL/L (ref 133–144)
TSH SERPL DL<=0.005 MIU/L-ACNC: 1.53 MU/L (ref 0.4–4)
WBC # BLD AUTO: 7.5 10E9/L (ref 4–11)

## 2020-01-24 PROCEDURE — 12400001 ZZH R&B MH UMMC

## 2020-01-24 PROCEDURE — 80053 COMPREHEN METABOLIC PANEL: CPT | Performed by: STUDENT IN AN ORGANIZED HEALTH CARE EDUCATION/TRAINING PROGRAM

## 2020-01-24 PROCEDURE — H2032 ACTIVITY THERAPY, PER 15 MIN: HCPCS

## 2020-01-24 PROCEDURE — 83735 ASSAY OF MAGNESIUM: CPT | Performed by: STUDENT IN AN ORGANIZED HEALTH CARE EDUCATION/TRAINING PROGRAM

## 2020-01-24 PROCEDURE — 25000132 ZZH RX MED GY IP 250 OP 250 PS 637: Performed by: STUDENT IN AN ORGANIZED HEALTH CARE EDUCATION/TRAINING PROGRAM

## 2020-01-24 PROCEDURE — 85025 COMPLETE CBC W/AUTO DIFF WBC: CPT | Performed by: STUDENT IN AN ORGANIZED HEALTH CARE EDUCATION/TRAINING PROGRAM

## 2020-01-24 PROCEDURE — 36415 COLL VENOUS BLD VENIPUNCTURE: CPT | Performed by: STUDENT IN AN ORGANIZED HEALTH CARE EDUCATION/TRAINING PROGRAM

## 2020-01-24 PROCEDURE — 80178 ASSAY OF LITHIUM: CPT | Performed by: STUDENT IN AN ORGANIZED HEALTH CARE EDUCATION/TRAINING PROGRAM

## 2020-01-24 PROCEDURE — G0177 OPPS/PHP; TRAIN & EDUC SERV: HCPCS

## 2020-01-24 PROCEDURE — 84443 ASSAY THYROID STIM HORMONE: CPT | Performed by: STUDENT IN AN ORGANIZED HEALTH CARE EDUCATION/TRAINING PROGRAM

## 2020-01-24 PROCEDURE — 84100 ASSAY OF PHOSPHORUS: CPT | Performed by: STUDENT IN AN ORGANIZED HEALTH CARE EDUCATION/TRAINING PROGRAM

## 2020-01-24 PROCEDURE — 99223 1ST HOSP IP/OBS HIGH 75: CPT | Mod: AI | Performed by: PSYCHIATRY & NEUROLOGY

## 2020-01-24 PROCEDURE — 82306 VITAMIN D 25 HYDROXY: CPT | Performed by: STUDENT IN AN ORGANIZED HEALTH CARE EDUCATION/TRAINING PROGRAM

## 2020-01-24 RX ORDER — ESCITALOPRAM OXALATE 10 MG/1
10 TABLET ORAL DAILY
Status: COMPLETED | OUTPATIENT
Start: 2020-01-26 | End: 2020-01-26

## 2020-01-24 RX ORDER — CLONAZEPAM 0.5 MG/1
0.5 TABLET ORAL 2 TIMES DAILY PRN
Status: DISCONTINUED | OUTPATIENT
Start: 2020-01-24 | End: 2020-01-27 | Stop reason: HOSPADM

## 2020-01-24 RX ORDER — ESCITALOPRAM OXALATE 20 MG/1
20 TABLET ORAL DAILY
Status: COMPLETED | OUTPATIENT
Start: 2020-01-25 | End: 2020-01-25

## 2020-01-24 RX ORDER — MULTIPLE VITAMINS W/ MINERALS TAB 9MG-400MCG
1 TAB ORAL DAILY
Status: DISCONTINUED | OUTPATIENT
Start: 2020-01-24 | End: 2020-01-27 | Stop reason: HOSPADM

## 2020-01-24 RX ORDER — LIDOCAINE 4 G/G
1-3 PATCH TOPICAL DAILY PRN
Status: DISCONTINUED | OUTPATIENT
Start: 2020-01-24 | End: 2020-01-27 | Stop reason: HOSPADM

## 2020-01-24 RX ADMIN — LIDOCAINE 2 PATCH: 560 PATCH PERCUTANEOUS; TOPICAL; TRANSDERMAL at 16:06

## 2020-01-24 RX ADMIN — LITHIUM CARBONATE 1200 MG: 450 TABLET, EXTENDED RELEASE ORAL at 21:04

## 2020-01-24 RX ADMIN — ESTRADIOL 2 MG: 2 TABLET ORAL at 21:34

## 2020-01-24 RX ADMIN — LITHIUM CARBONATE 1200 MG: 450 TABLET, EXTENDED RELEASE ORAL at 21:33

## 2020-01-24 RX ADMIN — ESTRADIOL 2 MG: 2 TABLET ORAL at 13:54

## 2020-01-24 RX ADMIN — Medication 0.75 MG: at 13:54

## 2020-01-24 RX ADMIN — Medication 0.75 MG: at 21:04

## 2020-01-24 ASSESSMENT — ACTIVITIES OF DAILY LIVING (ADL)
HYGIENE/GROOMING: INDEPENDENT
LAUNDRY: WITH SUPERVISION
DRESS: INDEPENDENT
ORAL_HYGIENE: INDEPENDENT
DRESS: INDEPENDENT
ORAL_HYGIENE: INDEPENDENT
HYGIENE/GROOMING: INDEPENDENT
LAUNDRY: WITH SUPERVISION

## 2020-01-24 NOTE — PROGRESS NOTES
01/23/20 1833   Patient Belongings   Patient Belongings locker;sent to security per site process   Belongings Search Yes   Clothing Search Yes   Second Staff Filiberto BARRERA     Pt belongings in locker:  Shoes  Hoodie  Shirt  Pants  Evens hat  Book  iPod  Wireless Speaker  Green Purse  Gum  Brown Purse  Shaver and   Card eller  Insurance cards  Four leaf clover  2x sharpies  Hand cream  Assorted business cards  Note cards  2x keys  Star wars key chain  Phone            Pt belongings sent to security #[id number]:  $427.00 Cash  Visa #[id number]  Social Security Card  Visa #[id number]    A               Admission:  I am responsible for any personal items that are not sent to the safe or pharmacy.  [facility] is not responsible for loss, theft or damage of any property in my possession.    Signature:  _________________________________ Date: _______  Time: _____                                              Staff Signature:  ____________________________ Date: ________  Time: _____      2nd Staff person, if patient is unable/unwilling to sign:    Signature: ________________________________ Date: ________  Time: _____     Discharge:  [facility] has returned all of my personal belongings:    Signature: _________________________________ Date: ________  Time: _____                                          Staff Signature:  ____________________________ Date: ________  Time: _____

## 2020-01-24 NOTE — PLAN OF CARE
BEHAVIORAL TEAM DISCUSSION    Participants:   Dr. Ferrrai, Dr. June, Anali Parker MA.INEZ, Rosario Amado RN, Med students    Anticipated length of stay:   5-7 days    Continued Stay Criteria/Rationale:   Patient attempted to hang self in ED    Medical/Physical:   On HRT     Precautions:   Behavioral Orders   Procedures     Code 1 - Restrict to Unit     Routine Programming     As clinically indicated     Self Injury Precaution     Single Room     Status 15     Every 15 minutes.     Status Individual Observation     Order Specific Question:   CONTINUOUS 24 hours / day     Answer:   5 feet     Order Specific Question:   Indications for SIO     Answer:   Self-injury risk     Order Specific Question:   Indications for SIO     Answer:   Suicide risk     Suicide precautions     Patients on Suicide Precautions should have a Combination Diet ordered that includes a Diet selection(s) AND a Behavioral Tray selection for Safe Tray - with utensils, or Safe Tray - NO utensils       Plan:  Patient will have ongoing psychiatric assessment.  Medications will be reviewed and adjusted per MD as indicated.  Outpatient providers will be contacted for care coordination.  Will look into eating disorder resources for patient.  Hospital staff will provide a safe environment and a therapeutic milieu. Patient will be encouraged to participate in unit groups and activities.   CTC will continue to assess needs and  ensure appropriate follow up care is in place.       Rationale for change in precautions or plan:   No change in plan/precautions

## 2020-01-24 NOTE — PROGRESS NOTES
Pt attended and participated in a structured mental health skills group session with a focus on creating a life tree. Patient participated in part of the life tree exercise.  Patient also participated in a exercise in positive thinking and shared several examples with the group.   Pt's affect was appropriate to task. Patient appeared to be displaying a fair mood. Pt participated in group and demonstrated good task focus, problem solving and pt interacted with peers in an effective manner.   patient was seen and evaluated at bedside on the day of planned surgery. She feels well. FHT, labs and VS reviewed. Patient was counseled on risks of planned repeat  section for unstable lie and previous  section: pain, bleeding, infection, injury to adjacent organs and vessels and VTE. Recovery and effect on subsequent pregnancies discussed with patient. Informed consent signed. Plant to OR for repeat  section.   MD cathy
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
## 2020-01-24 NOTE — PROGRESS NOTES
Went to patients room to offer am medications.  Pt laying in bed with eyes closed.  I introduced myself and offered meds to did not say a word nor did open her eyes.  Told pt let me know when or if she would like them  The psych assoc . Who is sitting on her SIO stated that she refused breakfast and am vitals.

## 2020-01-24 NOTE — PROGRESS NOTES
"CLINICAL NUTRITION SERVICES - ASSESSMENT NOTE     Nutrition Prescription    RECOMMENDATIONS FOR MDs/PROVIDERS TO ORDER:  None at this time    Malnutrition Status:    Patient does not meet two of the established criteria necessary for diagnosing malnutrition but is at risk for malnutrition given eating disorder of restricting behavior    Recommendations already ordered by Registered Dietitian (RD):  - Vanilla Boost Plus TID w/ meals  - Gatorade Zero TID per pt request   - Discussed menu selection and snack options (pt refused to receive any food at this time)    Future/Additional Recommendations:  - Monitor PO intake/weight trend  - Discuss additional food options as appropriate w/ pt to help meet nutritional needs      REASON FOR ASSESSMENT  Kelly Beth Behlen is a/an 39 year old female assessed by the dietitian for Provider Order - patient w/ restricting food intake/ hx of atypical anorexia.    PMH: borderline personality disorder, PTSD, unspecified eating disorder, narcicisstic personality disorder, body dysmorphia    NUTRITION HISTORY  Rosario states restricting her oral intake to very little intake during the day prior to coming to the hospital. She states going without any intake for 2 days in a row and just drinking liquids. She states she no longer has \"hunger cues\" given her progression of ED.    CURRENT NUTRITION ORDERS  Diet: Regular  Intake/Tolerance: Patient states agreeing to do 3 Boosts/day per discussion w/ primary team. Pt not interested at this time to do any other food items w/ meals.    LABS  Labs reviewed  Calcium - 8.3 (L)    MEDICATIONS  Medications reviewed  Multivitamin w/ minerals (THERA-VIT-M)    ANTHROPOMETRICS  Height: 190.5 cm (6' 3\")  Most Recent Weight: (pt refused)    IBW: 82 kg (172% IBW) based on most recent wt filed on 11/4/19  BMI: 39 kg/m2 (based on 11/14 wt)    Weight History: No wt hx documented on Epic. Pt states a wt loss of 50 lbs in two and half months. No wt hx documentation " filed.     Per Care Everywhere:   141.1 kg on 11/4/19  143.6 kg on 10/22/19  136.1 kg on 6/12/19  128 kg on 4/26/18  127.5 kg on 03/06/18    Dosing Weight: 97 kg (adjusted based on most recent wt)    ASSESSED NUTRITION NEEDS  Estimated Energy Needs: 5223-2001 kcals/day (20 - 25 kcals/kg)  Justification: Obese  Estimated Protein Needs: 78-97 grams protein/day (0.8 - 1 grams of pro/kg)  Justification: Obesity guidelines  Estimated Fluid Needs: (1 mL/kcal)   Justification: Maintenance    PHYSICAL FINDINGS  See malnutrition section below.    MALNUTRITION  % Intake: Unable to assess  % Weight Loss: Unable to assess  Subcutaneous Fat Loss: Unable to assess  Muscle Loss: Unable to assess  Fluid Accumulation/Edema: Unable to assess  Malnutrition Diagnosis: Patient does not meet two of the established criteria necessary for diagnosing malnutrition but is at risk for malnutrition given eating disorder of restricting behavior    NUTRITION DIAGNOSIS  Predicted suboptimal inadequate nutrient intake related to restricting behavior in the setting of ED as evidenced by poor PO intake, but willing to drink Boost TID     INTERVENTIONS  Implementation  Nutrition Education: Discussed importance of adequate caloric intake. Discussed menu food selection and snacks available on unit. Pt refuses ordering foods at this time given ED  Medical food supplement therapy: Boost TID per pt discussion w/ primary team  Modify composition of meals/snacks: Gatorade Zero TID     Goals  Patient to consume % of nutritionally adequate meal trays TID, or the equivalent with supplements/snacks.     Monitoring/Evaluation  Progress toward goals will be monitored and evaluated per protocol.
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
## 2020-01-24 NOTE — PROGRESS NOTES
"Pt was admitted on a 72 hour hold from the ED with multiple suicide plans. She initially was voluntary, but then slowly and deliberately ripped a pillowcase into threads and tried to strangle self in ED. No ligature marks or sequelae from attempt. Pt then placed on SIO and 72 HH. She is transgender male to female and presents with a bright and incongruent affect. She is calm, cooperative and seems very comfortable here. She states she has had twenty six inpatient mental health hospitalizations in her lifetime. She was most recently at [facility] and was last here on [address] in 2009. She also spent seven months committed at [facility]. She spoke at length about how she sued [facility] for civil rights violations and discrimination while she was hospitalized there identifying as transgender. She was quite verbose overall, and went into much detail on tangents, but was redirectable with some effort. She admits to several suicide attempts over the years and said she is battling something like fourteen DSM dx including MDD, PTSD, SIB, Anorexia, Anxiety. VSS upon admission and Utox negative. She denies hx of CD use. She denies any PO intake of food or fluid for a day and a half, stating \"My anorexia is a beast!\" She feels that she is \"not contributing\" to society which exacerbates her depression, but said she has a fiance, and enjoys music, movies, sci fi etc. She participated well in groups tonight, was visible in milieu and social on phone and with SIO staff. Pt will not sleep with pillowcases tonight while on SIO. AM lab draw and will continue to monitor.  "

## 2020-01-24 NOTE — PLAN OF CARE
Problem: General Plan of Care (Inpatient Behavioral)  Goal: Individualization/Patient Specific Goal (IP Behavioral)  Description  The patient and/or their representative will achieve their patient-specific goals related to the plan of care.    The patient-specific goals include:  Flowsheets (Taken 1/24/2020 0918)  Patient Strengths: Stable and supportive family; Utilized support systems; Financial stability; Stable housing; Adherent to medication regime  Note:   Patients goals:  Get into residential ED treatment    Plan for admission:  1. Stabilization of mood disorder symptoms  2. Absence of SI- safe with self  3. Medication management per MD's  4. Coordination of care with outpatient providers, family  5. Psychiatric follow up care in place

## 2020-01-24 NOTE — PROGRESS NOTES
"   01/23/20 2000   Group Therapy Session   Group Attendance attended group session   Total Time (minutes) 45   Group Type psychotherapeutic   Patient Participation/Contribution cooperative with task;discussed personal experience with topic;verbalizations were off topic   Group therapy goal: make worry stones from heather while processing current worries and discussing coping strategies.  Rosario presented as hyperverbal, facetious humor, grandiose. Participated in activity making 1 worry stone and one other (elongated shape).     She engaged with others. Rosario and 1:1 staff remained in group room with this writer after others had left. Spoke seriously about her preference for music in different languages and listening to the \"void\" between phonemes and morphemes which \"I am unable to do while listening to English music due to English fluency.\" She described the intricacies of her artwork and in deriving philosophical meaning from art in general. She reported not liking anything about herself other than her artistic skill.   "

## 2020-01-24 NOTE — PROGRESS NOTES
"Patient remained in bed napping, refused breakfast and vitals. Patient came out of room to meet with her care team and attended Mental Health Education group. Patient had a bright affect and was sociable. Rreported that her mood is \"low\" and feels depressed but uses her bubbly personality to cover up her emotions. Reported that she endorses suicidal ideation only, denies hurting self, will contact for safety.           01/24/20 8445   Behavioral Health   Hallucinations denies / not responding to hallucinations   Thinking distractable;poor concentration   Orientation person: oriented;place: oriented;date: oriented;time: oriented   Memory baseline memory   Insight admits / accepts   Judgement impaired   Eye Contact at examiner   Affect full range affect   Mood depressed   Physical Appearance/Attire appears stated age   Hygiene neglected grooming - unclean body, hair, teeth;body odor   Suicidality thoughts only   1. Wish to be Dead (Recent) No   2. Non-Specific Active Suicidal Thoughts (Recent) No   Self Injury   (denies )   Elopement   (none observed/mentioned )   Activity other (see comment)   Speech (WDL)   (isolative early in shift, visible in milieu and attending gp)   Speech clear;coherent   Medication Sensitivity no stated side effects   Psychomotor / Gait balanced;steady   Psycho Education   Type of Intervention 1:1 intervention   Response participates, initiates socially appropriate   Hours 0.5   Treatment Detail check-in   Safety   Suicidality Status 15;SIO (Status Individual Observation)  (NOTE - order will specify distance   Activities of Daily Living   Hygiene/Grooming independent   Oral Hygiene independent   Dress independent   Laundry with supervision   Room Organization independent   Groups   Details attended Mental health education      "

## 2020-01-24 NOTE — PLAN OF CARE
48 hour assessment- pt worked up crying, yelling on phone.  Pt states was on phone with friend Wilner and said he did not want want her to discharge today. Pt stated that she was just too upset and wanted me to talk to him.  Asked pt to sign LIANNE for Wilner which she did.  Pt also talking about a storage unit and she was late paying.  Pt stated expected to get paid either today or tomorrow. Pt said she couldn't talk to storage facility asked pt for name of place and we can googgle it and I'll call.  Pt dropped that topic.  Pt did considerable calm down after talking to her.  Pt got worked up later in day after talking to her mother.  Pt states is discharging tomorrow and going ot a hotel and will be staying there for the indefinite future.

## 2020-01-25 PROCEDURE — 12400001 ZZH R&B MH UMMC

## 2020-01-25 PROCEDURE — 25000132 ZZH RX MED GY IP 250 OP 250 PS 637: Performed by: STUDENT IN AN ORGANIZED HEALTH CARE EDUCATION/TRAINING PROGRAM

## 2020-01-25 RX ADMIN — ESTRADIOL 2 MG: 2 TABLET ORAL at 20:45

## 2020-01-25 RX ADMIN — SPIRONOLACTONE 200 MG: 100 TABLET ORAL at 10:02

## 2020-01-25 RX ADMIN — Medication 0.75 MG: at 20:45

## 2020-01-25 RX ADMIN — ESCITALOPRAM OXALATE 20 MG: 20 TABLET ORAL at 10:03

## 2020-01-25 RX ADMIN — Medication 0.75 MG: at 10:03

## 2020-01-25 RX ADMIN — ESTRADIOL 2 MG: 2 TABLET ORAL at 10:03

## 2020-01-25 RX ADMIN — MULTIPLE VITAMINS W/ MINERALS TAB 1 TABLET: TAB at 10:02

## 2020-01-25 RX ADMIN — ACETAMINOPHEN 650 MG: 325 TABLET, FILM COATED ORAL at 10:51

## 2020-01-25 RX ADMIN — ESTRADIOL 2 MG: 2 TABLET ORAL at 14:14

## 2020-01-25 RX ADMIN — Medication 0.75 MG: at 14:14

## 2020-01-25 RX ADMIN — LITHIUM CARBONATE 1200 MG: 450 TABLET, EXTENDED RELEASE ORAL at 22:01

## 2020-01-25 RX ADMIN — PROGESTERONE 100 MG: 100 CAPSULE ORAL at 10:03

## 2020-01-25 ASSESSMENT — ACTIVITIES OF DAILY LIVING (ADL)
ORAL_HYGIENE: INDEPENDENT
HYGIENE/GROOMING: INDEPENDENT
LAUNDRY: WITH SUPERVISION
DRESS: INDEPENDENT

## 2020-01-25 NOTE — PROGRESS NOTES
Pt endorses SI thoughts and SIB urges. Pt states that if she were to attempt to kill herself again it would be in the same way as in the ED since it was not painful. Pt expressed urges to cut. Pt stated she felt disrespected by staff and that one called her a derogatory name. Pt slept on an off until she had visitors. Pt attended music group but left abruptly because, she reports, that a song triggered her emotions and she started crying. Pt became upset at staff for not checking in with her at that time. Pt reports feeling worthless and hopeless.          01/24/20 2200   Behavioral Health   Hallucinations denies / not responding to hallucinations   Thinking poor concentration   Orientation person: oriented;place: oriented;date: oriented;time: oriented   Memory baseline memory   Insight admits / accepts   Judgement impaired   Eye Contact at examiner   Affect full range affect   Mood depressed   Physical Appearance/Attire appears stated age   Hygiene neglected grooming - unclean body, hair, teeth   Suicidality thoughts and plan   1. Wish to be Dead (Recent) Yes   2. Non-Specific Active Suicidal Thoughts (Recent) Yes   Self Injury other (see comment)  (denies)   Elopement   (none observed)   Activity withdrawn;other (see comment)  (social at times)   Speech clear;coherent   Medication Sensitivity no stated side effects   Psychomotor / Gait balanced;steady   Activities of Daily Living   Hygiene/Grooming independent   Oral Hygiene independent   Dress independent   Laundry with supervision   Room Organization independent

## 2020-01-25 NOTE — PROGRESS NOTES
Rosario came to group late.  Requested a song in honor of her  loved one.  Song was played and Rosario began to cry.  MT held space for Rosario's emotions.  Another peer offered a song of comfort for Rosario.  Rosario left during this song to process with staff privately.  Did not return to group. Relayed information to charge nurse.

## 2020-01-25 NOTE — PROGRESS NOTES
"The pt had a labile shift. She was visible and somewhat social in the morning, attending community meeting and participating appropriately. She reports feeling \"depressed and hopeless\" although this is incongruent with her affect at the time. She also reported feeling \"lost\" but didn't expound on that feeling when prompted. She only ate Boost and complained of not feeling well late morning. She reports chronic SI, with a plan. She denies SIB urges and hallucinations.      01/25/20 1409   Behavioral Health   Hallucinations denies / not responding to hallucinations   Thinking poor concentration   Orientation person: oriented;place: oriented;date: oriented;time: oriented   Memory baseline memory   Insight admits / accepts;poor   Judgement impaired   Eye Contact at examiner   Affect full range affect;sad;irritable   Mood labile;mood is calm;irritable;depressed   Physical Appearance/Attire attire appropriate to age and situation   Hygiene other (see comment)  (adequate)   Suicidality thoughts and plan   1. Wish to be Dead (Recent) Yes   2. Non-Specific Active Suicidal Thoughts (Recent) Yes   Self Injury other (see comment)  (denies)   Elopement   (nothing to report)   Activity withdrawn;other (see comment)  (visible at times)   Speech coherent   Medication Sensitivity no stated side effects;no observed side effects   Psychomotor / Gait balanced     "

## 2020-01-26 LAB — DEPRECATED CALCIDIOL+CALCIFEROL SERPL-MC: 18 UG/L (ref 20–75)

## 2020-01-26 PROCEDURE — 25000132 ZZH RX MED GY IP 250 OP 250 PS 637: Performed by: STUDENT IN AN ORGANIZED HEALTH CARE EDUCATION/TRAINING PROGRAM

## 2020-01-26 PROCEDURE — 12400001 ZZH R&B MH UMMC

## 2020-01-26 RX ADMIN — Medication 0.75 MG: at 15:14

## 2020-01-26 RX ADMIN — ESTRADIOL 2 MG: 2 TABLET ORAL at 20:57

## 2020-01-26 RX ADMIN — ESCITALOPRAM OXALATE 10 MG: 10 TABLET ORAL at 12:07

## 2020-01-26 RX ADMIN — PROGESTERONE 100 MG: 100 CAPSULE ORAL at 12:08

## 2020-01-26 RX ADMIN — ESTRADIOL 2 MG: 2 TABLET ORAL at 12:07

## 2020-01-26 RX ADMIN — Medication 0.75 MG: at 20:59

## 2020-01-26 RX ADMIN — SPIRONOLACTONE 200 MG: 100 TABLET ORAL at 12:07

## 2020-01-26 RX ADMIN — Medication 0.75 MG: at 12:08

## 2020-01-26 RX ADMIN — LITHIUM CARBONATE 1200 MG: 450 TABLET, EXTENDED RELEASE ORAL at 20:58

## 2020-01-26 RX ADMIN — ESTRADIOL 2 MG: 2 TABLET ORAL at 15:14

## 2020-01-26 RX ADMIN — MULTIPLE VITAMINS W/ MINERALS TAB 1 TABLET: TAB at 12:07

## 2020-01-26 ASSESSMENT — ACTIVITIES OF DAILY LIVING (ADL)
ORAL_HYGIENE: PROMPTS
HYGIENE/GROOMING: PROMPTS
DRESS: PROMPTS
ORAL_HYGIENE: PROMPTS
HYGIENE/GROOMING: PROMPTS
DRESS: PROMPTS

## 2020-01-26 ASSESSMENT — MIFFLIN-ST. JEOR: SCORE: 2109.23

## 2020-01-26 NOTE — PROGRESS NOTES
"02:00 Pt continues to be unsteady and pace the ramirez. Pt wobbled and slowly lowered herself to the ground in an effort to fall. Pt refuses redirection to go sit or lay down, waving her hand and stating \"No, I'm fine.\" Pt starts walking again stating \"Wow, you get such an adrenaline rush from falling.\"    "

## 2020-01-26 NOTE — PROGRESS NOTES
"Pt told staff \"I can't believe you're letting someone who hasn't eaten pace.\" but refused to go to her room when asked. Pt went into the wall and scrapped her elbow and sat on the floor.  Pt asked to go to her room and either sit and lay down.  Staff told pt we want her to be safe.  She was rude and told staff if she fell the staff would be unable to catch her because she has a hundred pounds on us.  This writer tried to talk to pt.  She stated she was frustrated and only walking would help her.  I asked if there were any other coping skills that could help.  She said no.  Pt sat on the floor several times stating she is tired.  "

## 2020-01-26 NOTE — PROGRESS NOTES
Patient presented with a labile mood and irritable affect. While in the lounge, patient spoke loudly about their plan to restrict their diet. Instead of speaking to nursing staff privately, patient continued to create a negative environment around other patients until being redirected to speak about a more therapeutic topic in the lounge. Later, patient was offered hygiene items, as they have neglected their oral cares and grooming. Pt became agitated and refused to shower, but did agree to brush their teeth.

## 2020-01-26 NOTE — PROGRESS NOTES
02:20 Pt leans against the wall, grabs the railing, and falls to a sit on the ground. Pt's falls seem very intentional and are fueled by an increase in staff attention. Pt had mentioned that she doesn't care if she gets hurt.

## 2020-01-26 NOTE — PROGRESS NOTES
"Pt came to medication window for night time medications and asked why she only has Lidocaine 4% offered and not 5%. Writer educated patient that it would be passed on to morning shift to ask for clarification from 4% to 5%. Pt states \"I have told so many staff\". Will report to next shift to pass on.   "

## 2020-01-26 NOTE — PROGRESS NOTES
"House Officer ( Dr. Marques) came to the unit and assessed pt.  No injuries noted except for an abrasion on her left elbow the size of a nickel.  This writer sat on the floor with the pt for almost an hour.  Pt stated \"No one cares about me.  Everyone is rude.  I haven't eaten in 4 days no one cares.  You guys let me pace for hours.  I hate having one-to-one staff.  I have been to [facility] and it didn't help.  I have no coping skills.  There is nothing you can do to help me.\"  After talking with the pt she agreed to drink a 12 ounce Gatorade, walk to her room, and go to sleep if this writer would be her one-to-one until she falls a sleep.  This writer did her SIO until she fell a sleep.  When this writer asked staff to cover for her so she could go the the bathroom.  The pt stated\" You gave up on me already\".  This writer had to explain she was using the bathroom.  Pt sought a lot of attention while refusing redirection, prn medications and other coping measures.  "

## 2020-01-26 NOTE — PROGRESS NOTES
Pt on SIO she has been pacing for 45 minutes.  She is slightly unsteady.  Pt asked to go to her room and go to sleep.  She refused.  Pt asked if she wanted snacks, prn's, or Gatorade.  Pt is rude to staff.

## 2020-01-26 NOTE — PROGRESS NOTES
"Brief Hospitalist Note     Called ~2 am regarding a fall. Rosario is a 40 yo trans woman with disordered eating, hospitalized on [facility] [address] for SI. She has been restless and pacing all night, with several instances of falls/lowering herself to the ground, one of which resulted in a right elbow abrasion.   Entering [address], Rosario is visible in the distance pacing along the hallway, does stop and heavily sit/lower herself to the ground. Upon my evaluation, she states she is \"fine\" after the fall that resulted in the abrasion. She endorses these are mechanical falls, where her legs \"give out\" but she is \"awake\" and without head trauma. Denies any loss of consciousness or fainting but then later says she is not sure. All falls witnessed by staff (on 1:1) and no LOC noted. She describes walking as a form of self-harm because she has a 1:1 and cant cut. She identifies this as a manifestation of her eating disorder that pacing and walking these \"180 laps, or 11 miles\" is her way of coping but her \"body is giving up\". She has been having intermittent chest pain, which is chronic, may be worse now after all her walking but not relieved by rest, non-radiating, atypical.   Exam notable for fatigued-appearing woman, cooperative, poor eye contact. No head trauma, PERRLA. RRR without murmurs, no reproducible chest wall pain, lungs CTAB, Abrasion over lateral left forearm near elbow. No other injury or pain with palpation of joints.   We discussed the likely etiology of her falls being related to fatigue and poor PO intake, mechanical in nature. Recommended she return to her room or take a break, but she declined. Stated \"I hope I do keep falling, maybe I'll even break a bone, all of this is so I feel something\". Has declined PRNs per nursing staff.   Last QTc nl, chest pain likely musculoskeletal and unlikely cardiac but could consider repeating EKG if poor PO/nutritional concerns continue given her history. At this point no " significant injury from her pacing/behaviors but will continue to encourage her to work with staff to utilize other coping mechanisms, PRN meds.     Floresita Marques MD   Med-Peds hospitalist   [phone]

## 2020-01-26 NOTE — PROGRESS NOTES
"Pt endorses chronic SI thoughts. Pt states \"I know I don't look it but I'm really depressed and hopeless.\" Pt stated that she was tired of having a 1:1. Pt stated that she is not getting enough attention here. Pt refused to shower. Pt appears more irritable today. Pt did not eat anything this evening stating that the boost is making her ill. Pt played games with peers and attended community meeting. Pt tried to attend art therapy but became very disrespectful and stormed out.           01/25/20 2104   Behavioral Health   Hallucinations denies / not responding to hallucinations   Thinking poor concentration   Orientation person: oriented;place: oriented;date: oriented;time: oriented   Memory baseline memory   Insight admits / accepts;poor   Judgement impaired   Eye Contact at examiner   Affect full range affect;incongruent   Mood labile;mood is calm   Physical Appearance/Attire attire appropriate to age and situation   Hygiene neglected grooming - unclean body, hair, teeth   Suicidality thoughts and plan   1. Wish to be Dead (Recent) Yes   2. Non-Specific Active Suicidal Thoughts (Recent) Yes   Self Injury other (see comment)  (denies)   Elopement   (none observed)   Activity other (see comment)  (social in milieu)   Speech clear;coherent   Medication Sensitivity no stated side effects   Psychomotor / Gait balanced;steady   Activities of Daily Living   Hygiene/Grooming independent   Oral Hygiene independent   Dress independent   Laundry with supervision   Room Organization independent     "

## 2020-01-26 NOTE — PROGRESS NOTES
01/26/20 1253   Behavioral Health   Hallucinations denies / not responding to hallucinations   Thinking poor concentration   Orientation person: oriented;place: oriented;date: oriented   Memory baseline memory   Insight poor   Judgement impaired   Eye Contact   (head under covers/blanket)   Affect blunted, flat   Mood depressed;hopeless   Physical Appearance/Attire disheveled   Hygiene body odor   Suicidality chronic thoughts with no stated plan   1. Wish to be Dead (Recent) Yes   Wish to be Dead Description (Recent)   (wishes to die)   2. Non-Specific Active Suicidal Thoughts (Recent) No   Non-Specific Active Suicidal Thought Description (Recent)   (no active thoughts today)   Self Injury other (see comment)  (no urges today)   Elopement   (nothing to report)   Activity withdrawn;isolative   Speech other (see comments)  (quite and unengaged)   Medication Sensitivity sedation   Psychomotor / Gait slow;steady   Psycho Education   Type of Intervention 1:1 intervention   Response participates with encouragement   Hours 0.5   Treatment Detail   (check in)   Activities of Daily Living   Hygiene/Grooming prompts   Oral Hygiene prompts   Dress prompts   Room Organization independent   The patient was in her room most of the shift and did not come out for meals.  The patient was sleeping most of the time in her room.  The patient is refusing to eat and stated she is forcing herself to not eat as part of her eating disorder.  The patient states she would be willing to drink strawberry Boost only, but she was aware staff had been attempting to get that from dietary unsuccessfully.  The patient was resistant to all attempts to engage and to work on trying coping skills.  The patient was not engaged in our check in and was finding tangential reasons for all the options of staff engagement.  The patient just wants to stay sleeping all shift.  The patient refused vital signs, coming out of room to be social, watch movies, and  "eat meals.  The patient was pessimistic about all aspects of our conversation.  The patient denies SIB and urges, stating the she walked so much yesterday that her back pain from that was \"enough.\"  The patient also endorsed passive fleeting SI and reports not having any plans to attempt or thoughts about attempting.    "

## 2020-01-27 VITALS
OXYGEN SATURATION: 96 % | SYSTOLIC BLOOD PRESSURE: 110 MMHG | HEART RATE: 70 BPM | DIASTOLIC BLOOD PRESSURE: 77 MMHG | HEIGHT: 72 IN | RESPIRATION RATE: 16 BRPM | BODY MASS INDEX: 38.21 KG/M2 | TEMPERATURE: 96.9 F | WEIGHT: 282.1 LBS

## 2020-01-27 PROCEDURE — G0177 OPPS/PHP; TRAIN & EDUC SERV: HCPCS

## 2020-01-27 PROCEDURE — 25000132 ZZH RX MED GY IP 250 OP 250 PS 637: Performed by: STUDENT IN AN ORGANIZED HEALTH CARE EDUCATION/TRAINING PROGRAM

## 2020-01-27 PROCEDURE — 99238 HOSP IP/OBS DSCHRG MGMT 30/<: CPT | Mod: GC | Performed by: PSYCHIATRY & NEUROLOGY

## 2020-01-27 RX ORDER — CLONAZEPAM 0.5 MG/1
0.75 TABLET ORAL 3 TIMES DAILY
Start: 2020-01-27 | End: 2020-12-27

## 2020-01-27 RX ORDER — CHOLECALCIFEROL (VITAMIN D3) 1250 MCG
50000 CAPSULE ORAL
Status: DISCONTINUED | OUTPATIENT
Start: 2020-01-27 | End: 2020-01-27 | Stop reason: HOSPADM

## 2020-01-27 RX ORDER — SPIRONOLACTONE 100 MG/1
100 TABLET, FILM COATED ORAL 2 TIMES DAILY
Qty: 60 TABLET | Refills: 0 | Status: SHIPPED | OUTPATIENT
Start: 2020-01-27 | End: 2020-02-26

## 2020-01-27 RX ORDER — ERGOCALCIFEROL 1.25 MG/1
50000 CAPSULE, LIQUID FILLED ORAL WEEKLY
Qty: 4 CAPSULE | Refills: 0 | Status: SHIPPED | OUTPATIENT
Start: 2020-01-27 | End: 2020-12-27

## 2020-01-27 RX ORDER — LITHIUM CARBONATE 300 MG/1
1200 TABLET, FILM COATED, EXTENDED RELEASE ORAL AT BEDTIME
Qty: 40 TABLET | Refills: 0 | Status: SHIPPED | OUTPATIENT
Start: 2020-01-27 | End: 2020-02-06

## 2020-01-27 RX ORDER — ESTRADIOL 2 MG/1
2 TABLET ORAL 3 TIMES DAILY
Qty: 90 TABLET | Refills: 0 | Status: SHIPPED | OUTPATIENT
Start: 2020-01-27 | End: 2020-02-26

## 2020-01-27 RX ADMIN — MULTIPLE VITAMINS W/ MINERALS TAB 1 TABLET: TAB at 09:29

## 2020-01-27 RX ADMIN — ESTRADIOL 2 MG: 2 TABLET ORAL at 09:29

## 2020-01-27 RX ADMIN — PROGESTERONE 100 MG: 100 CAPSULE ORAL at 09:28

## 2020-01-27 RX ADMIN — Medication 0.75 MG: at 09:28

## 2020-01-27 RX ADMIN — SPIRONOLACTONE 200 MG: 100 TABLET ORAL at 09:29

## 2020-01-27 ASSESSMENT — ACTIVITIES OF DAILY LIVING (ADL)
DRESS: INDEPENDENT
LAUNDRY: WITH SUPERVISION
ORAL_HYGIENE: INDEPENDENT
HYGIENE/GROOMING: INDEPENDENT

## 2020-01-27 NOTE — PROGRESS NOTES
Patient met with team this morning.  She was mostly focused on her eating disorder sx's and her desire to get into residential treatment.  Patient is aware that this hospital is unable to provide ED treatment and Dr. Godinez offered to write a letter to patient's insurance to state he recommends further inpatient treatment.  I spoke with [employer] Liason Stella Ruiz and she provided contact information for where to send the letter.  Patient denies SI and states she is safe to discharge home with partner.  Patient has f/u appts in place with both her Psychiatric provider and therapist.  Patient's partner will pick her up later today.

## 2020-01-27 NOTE — PROGRESS NOTES
Pt discharged as per order.  Pt denied SI.  Reviewed discharge as per order.  Pt cooperative.  Pt discharged with discharge paperwork, and ordered discharge medication.  Staff walked pt to [facility] parking lot where her car is parked.

## 2020-01-27 NOTE — PROGRESS NOTES
"Endorses passive SI with no plan, denies SIB and hallucinations. Presented with an irritable mood and blunted, flat affect. At check-in, pt was pessimistic and tangential in thought. During the shift, patient began to unscrew bolt-like hardware from the phone and refused to utilize fidget and other coping mechanisms. Instead, pt removed the hardware and wiggled the phone loose and said \"found something to be fixed\"- RN was notified of behavior and maintenance was called to correct the issue. Pt ate a [employer] foot long that was brought in by visitors because she \"promised friends\" that she would eat. Pt has neglected oral hygiene and grooming, refused to use their toothbrush this evening - 1:1 staff reports a foul odor from pt.        01/26/20 2100   Behavioral Health   Hallucinations denies / not responding to hallucinations   Thinking intact   Orientation person: oriented;place: oriented;date: oriented;time: oriented   Memory other (see comment)  (poor recall)   Insight poor   Judgement impaired   Eye Contact at examiner   Affect irritable;blunted, flat;tense   Mood irritable;anxious;depressed   Physical Appearance/Attire untidy;disheveled   Hygiene body odor;neglected grooming - unclean body, hair, teeth   Suicidality chronic thoughts with no stated plan   1. Wish to be Dead (Recent) Yes   2. Non-Specific Active Suicidal Thoughts (Recent) No   Activities of Daily Living   Hygiene/Grooming prompts   Oral Hygiene prompts   Dress prompts   Room Organization prompts     "

## 2020-01-27 NOTE — DISCHARGE INSTRUCTIONS
Behavioral Discharge Planning and Instructions    Summary:   You were admitted to [facility] on 1/23/2020 with worsening depression, suicidal ideation  under the care of Dr. Lomeli.  You met with the Attendings and their team daily for ongoing psychiatric assessment and medication management.  You had opportunities to participate in therapeutic groups on the unit.   At this time you report your mood is stabilizing and you report you are not having thoughts or intent to harm yourself or others. You will be discharged home and will resume care with your outpatient providers.    Disposition:  Home    Main Diagnosis:   Major Depressive Disorder  Eating Disorder NEC    Major Treatments, Procedures and Findings:   Medications were  managed throughout your stay. An internal medicine consult was completed during your stay. You had the opportunity to participate in treatment programming while on the unit including occupational therapy, mental health support and education and spiritual services.     Symptoms to Report:   Please report if you are experiencing increased aggression and/or confusion, problematic loss of sleep, worsening mood, or thoughts of suicide to your treatment team or notify your primary provider.   IF THE SYMPTOMS YOU ARE EXPERIENCING ARE A MEDICAL EMERGENCY, CALL 911 IMMEDIATELY    Lifestyle Adjustment:   1. Adjust your lifestyle to get enough sleep, relaxation, exercise and good nutrition.  Continue to develop healthy coping skills to decrease stress and promote a healthy lifestyle.  2. Abstain from all substances of abuse.  3. Take medications as prescribed.  Please work with your doctor to discuss any concerns you have with your medications or side effects you may be experiencing.  4. Follow up with appointments as scheduled.        Psychiatry Follow-up:   Appointment:  Damion Nowak:  2/11/20 at 7:45am   [facility]: [address] [facility] [place] [address]    [phone]    Appointment: Psychiatry: Lolis Samuel CNP:  2/28/20 at 8:00am ( You have been placed on a cancellation list to get a sooner appt)  [facility] [place] [address] [address] [place] [address]  [phone]    Fax: [phone]    Resources:   Suicide and Crisis Response  The [facility] Crisis Response Unit (CRU) provides 24-hour phone and face-to-face crisis intervention and consultation. Call [phone].   The primary goal of the Crisis Response Unit is to assist in stabilizing the immediate crisis, ensure safety for the client, the family and/or the community, and assist with referrals to appropriate county or other agency staff as necessary.   Other crisis resources:   National [facility] [employer] [facility] Hotline for Suicide: 2-354-NDLWKAG (1-170.995.1868)   [place] Crisis Text Line: Text  Home  to 633683 to communicate with a trained crisis counselor    Call for 24/7 help:  For [facility] patients, please call [phone] or [phone]      General Medication Instructions:   See your medication sheet(s) for instructions.   Take all medicines as directed.  Make no changes unless your doctor suggests them.   Go to all your doctor visits.  Be sure to have all your required lab tests. This way, your medicines can be refilled on time.  Do not use any drugs not prescribed by your doctor.    The treatment team has appreciated the opportunity to work with you.  We wish you the best in the future.    If you have any questions or concerns our unit number is [phone].

## 2020-01-28 NOTE — DISCHARGE SUMMARY
"    Psychiatric Discharge Summary    Hospital Day #4    Kelly Beth Behlen MRN# [id number]   Age: 39 year old YOB: 1980     Date of Admission:  1/23/2020  Date of Discharge:  1/27/2020  Admitting Physician:  Yohan Ferrari MD  Discharge Physician:  Reymundo Godinez MD         Event Leading to Hospitalization:     Per ED report/DEC:  Patient comes in voluntarily for increasing suicidal thoughts. She has extensive history of psychiatric care with several admissions throughout childhood. She had an admission in 2006 for overdose on #60 trazodone with subsequent commitment, and many admissions since then. She reports the most recent treatment encounters started in November 2019, when her therapist told her that her problems were \"too difficult to manage\". She was admitted for a couple weeks and discharged to [facility] residential service for 5 weeks. She said as she was nearing discharge, \"I knew I wasn't ready but they kept telling me I was.\"     A week ago she was started on their IDP program and says that she was not doing well; by last Friday she said \"I was so anxious, I knew I couldn't keep going\" but when she called to ask about not attending on Friday, she was told that she would be dropped if she did not attend. She called her [facility]'s care manager on Monday to see if she could get back into the residential program but was told \"no\" and told she could not go back into the IDP program. She reports feeling \"exposed and lonely\" and that \"I'm sinking under the weight of all my mental health problems\" stating that she's had 14 separate diagnoses. She says \"I'm a disaster\" and \"worthless\" and \"am not contributing anything to society.\" She said this recently was worsened when she looked up the status on \"my first love--we did so many firsts together\" and learned that a month about this woman and her two children were murdered by the woman's ex-. \"She was so bright and vibrant, she didn't " "deserve that\". She says that after she ingested all the trazodone, that \"it started to kill off my body a little at a time, first my legs went numb, then I couldn't feel my arms, then I couldn't move my head. When I blinked, I couldn't open my eyes again. Then I wasn't able to swallow. Finally I couldn't breathe and my heart started beating faster...then it stopped, but for about 2 seconds I was still aware and that was the best feeling I've ever had in my life. I didn't have to worry about anything, my illness, my life, nothing. I would just move on.\" She states that she would like to feel like that again as a last memory, but \"without the part of waking up later in an ICU.\" She feels that she  in complete calm and peace while her friend who was murdered had  in terror and fear. She feels like she just can't keep living like this and wants to \"end myself.\" She denies recent drug or alcohol use, and denies violent ideation or intent. Denies symptoms of psychosis.     Per patient report:    Reports that since leaving the residential [facility], she has felt unsupported/anxious, feels like she needs to go back to a residential program. Said that she had tried to contact her insurance re: residential treatment programs, but that none were covered. Has tried calling many research institutations ([facility], [facility]), to see if she could enroll in their treatment trials, such that she could get residential treatment paid for. Described inability to follow the diet rules of the [facility] step-down eating disorder program, and feeling like staff there were too strict and unreasonable (for example, she asked to take a day off during her first week, and they said that she could not do this). With regard to eating disorder, reports that she restricts (does not binge or purge), and over the last 2 weeks has lost 12 lbs, and over the last month has lost 61lbs. Said that she would eat " "1400-1600calories when around her fiancee, because she gets worried/scared for patient (patient reports heart damage, electrolyte abnormalities prior to [facility]).      Multiple times throughout conversation, describes how she \"could have done so much more\" with her life (\"I could have been a doctor, a , a \"), but said that because of her various traumas and mental illnesses, she has been unable to \"make anything of my life.\" Said that she has a \"121 IQ,\" and therefore should be more successful. Became tearful when describing an ex-significant other that she recently found out was murdered, saying, \"it's not fair that she's dead and that I'm alive; she did so much for society, I do nothing.\"      Described how she has felt alone for much of her life, and that she feels like so many things have been done to her outside of her control. For example, her name was changed when she was a teenager, making her identity more confusing, and also she delt with gender identity issues. She came out as trans in 2005, and when doing that, he entire family disowned her. Her gender has caused her to be treated poorly, especially in the healthcare setting. She recently won a lawsuit with [facility] re: her treatment in inpatient psychiatry for \"6 figures,\" which she said that she quickly spent much of afterward (to buy a car, pay off her fiance's debt).      With regard to self-harm and suicidality, she said, \"OK, I'm going to get a little philosophical if it's OK, like Descartes and stuff, do you know him?: feels like she has a \"consciousness\" separate from her body, and that she hates her body/doesn't have any positive feelings toward it, therefore, she self-harms frequently. Showed this writer scars on legs and arm, and was somewhat proud in describing how she usually self-harms without sharp objects. Said that she self-harms to punish herself, and also to deal with significant anxiety (\"physical pain is " "easier than mental pain.\") When describing the emergency department event this morning where she tied a pillow around her neck, said \"that was kind of impressive, wasn't it? I almost want a picture of how blue I was.\"      She also talked at length about how she has \"14 different diagnoses.\" Said that she has read \"every [facility] webpage about each diagnoses, like 100-200 pages each.\" Agrees with each diagnosis she has been given. Feels like she has some insight into her mental illnesses, but feels \"like they're paintings made by someone else; like I'm really good at interpreting them, but they're not mine.\"      Of note, she said that she will probably \"act out\" here and be \"immature.\" Did not go into detail re: what this meant. Also said that she has gone to \"DBT treatment 3 times, it doesn't work.\" Is specifically interested in \"TFP\" (transference focused therapy).        See Admission note by Yohan Ferrari MD on 1/23/20 for additional details.          Diagnoses:     #borderline personality disorder  #atypical anorexia  #PTSD         Consults:     Dietician         Hospital Course:   Psychiatric Course:  Kelly Beth Behlen was admitted to [facility] with attending Yohan Ferrari MD on a 72 hour mental health hold, but patient subsequently signed in voluntarily. PTA medication were continued. In the emergency department prior to admission, she attempted to die by tying a pillow around her neck. She reported that she did this due to feeling \"forgotten\" by ED staff for hours, reinforcing how \"alone\" she feels chronically. Once on the unit, and throughout her hospitalization, she was able to contract for safety and did not self-harm, denied active SI, endorsed chronic passive SI.     Rosario's major concern throughout this admission was her continued eating disorder (restricting behavior), and feeling like she should not have been discharged from the inpatient [facility] as soon as she did. On our unit, she frequently " "restricted her intake on the unit, although occasionally ate (ate a footlong subway sandwich, etc.), staying vitally stable and her admission labs were unremarkable, apart from slightly low protein and albumin. She did not require IV fluids or other more invasive measures. Our team and Rosario agreed that our unit was not ideal for treating eating disorders, and that the focus on the admission was to decrease feelings of suicidality. This writer did send a recommendation to her insurance to recommend continued residential treatment for her atypical anorexia.    With regard to negative mood alterations (low mood, feelings of worthlessness, feelings of emptiness), she reported feeling like her medications \"don't do anything,\" and given that, we elected to discontinue escitalopram, and discussed future option of TMS for treatment-resistant depression in [facility] [place], once her eating disorder is more manageable. Did elect to keep lithium on her regimen to help with mood stability and suicidality. On day of discharge, she denied active SI or urge to self-harm, and was deemed safe to discharge to home with her partner.      Kelly Beth Behlen was discharged to home with joe. At the time of discharge Kelly Beth Behlen was determined to not be a danger to herself or others. They adamantly denied SI or HI.    Medical Course: In the ED patient was determined to be medically stable for psychiatric inpatient admission. Admission labs completed and notable for borderline low albumin and total protein, low vitamin D (18). Internal medicine was consulted for a fall where she scraped her elbow, determined to most likely be a mechanical fall.     Risk Assessment:  Kelly Beth Behlen has notable risk factors for self-harm, including anxiety and previous suicide attempts. However, risk is mitigated by commitment to family, ability to volunteer a safety plan and history of seeking help when needed.Additional steps taken to minimize " risk include: follow-up with outpatient mental health. Therefore, based on all available evidence including the factors cited above, Kelly Beth Behlen does not appear to be at imminent risk for self-harm, and is appropriate for outpatient level of care.     This document serves as a transfer of care to Kelly Beth Behlen's outpatient providers.         Discharge Medications:     Discharge Medication List as of 1/27/2020 12:00 PM      CONTINUE these medications which have CHANGED    Details   clonazePAM (KLONOPIN) 0.5 MG tablet Take 1.5 tablets (0.75 mg) by mouth 3 times daily, No Print Out      estradiol (ESTRACE) 2 MG tablet Take 1 tablet (2 mg) by mouth 3 times daily, Disp-90 tablet, R-0, E-Prescribe      lithium ER (LITHOBID/ESKALITH CR) 300 MG CR tablet Take 4 tablets (1,200 mg) by mouth At Bedtime for 10 days, Disp-40 tablet, R-0, E-Prescribe      progesterone (PROMETRIUM) 100 MG capsule Take 1 capsule (100 mg) by mouth daily, Disp-30 capsule, R-0, E-Prescribe      spironolactone (ALDACTONE) 100 MG tablet Take 1 tablet (100 mg) by mouth 2 times daily, Disp-60 tablet, R-0, E-Prescribe      vitamin D2 (ERGOCALCIFEROL) 89860 units (1250 mcg) capsule Take 1 capsule (50,000 Units) by mouth once a week, Disp-4 capsule, R-0, E-Prescribe         CONTINUE these medications which have NOT CHANGED    Details   Lidocaine (LIDOCARE) 4 % Patch Place 1-3 patches onto the skin daily as needed for moderate pain To prevent lidocaine toxicity, patient should be patch free for 12 hrs daily.Historical      metaxalone (SKELAXIN) 800 MG tablet Take 800 mg by mouth 3 times daily as needed (muscle spasm), Historical      multivitamin w/minerals (THERA-VIT-M) tablet Take 1 tablet by mouth daily, Historical         STOP taking these medications       escitalopram (LEXAPRO) 10 MG tablet Comments:   Reason for Stopping:                      Psychiatric Examination:   Appearance:  awake, alert and slightly unkempt  Attitude:  cooperative,  "occasionally sarcastic   Eye Contact:  good  Mood:  \"fine\"  Affect:  mood congruent, constricted mobility and reactive  Speech:  clear, coherent  Psychomotor Behavior:  no evidence of tardive dyskinesia, dystonia, or tics and intact station, gait and muscle tone  Thought Process:  logical, linear and goal oriented  Associations:  no loose associations  Thought Content:  no evidence of suicidal ideation or homicidal ideation and no evidence of psychotic thought  Insight:  fair  Judgment:  fair  Oriented to:  time, person, and place  Attention Span and Concentration:  intact  Recent and Remote Memory:  intact  Language: speaks english fluently  Fund of Knowledge: appropriate  Muscle Strength and Tone: normal  Gait and Station: Normal         Discharge Plan:   Appointment:  Therapy Ana Lilia Nowak:  2/11/20 at 7:45am   [facility]: [facility] [place] [address]   [phone]     Appointment: Psychiatry: Lolis Samuel CNP:  2/28/20 at 8:00am ( You have been placed on a cancellation list to get a sooner appt)  [facility] [place] [address] [phone]    Fax: [phone]       Pt seen and discussed with my attending, MD Lynsey Vinson MD  PGY-1 Resident         Attestation:  I, Reymundo Godinez, saw and evaluated the patient with the resident physician.  I agree with the findings and plan of care as documented in the resident note.  I have reviewed all labs and vital signs.  I, Reymundo Godinez, have reviewed this summary and agree with the findings and discharge plan as written.                Appendix A: All Labs This Admission:     Results for orders placed or performed during the hospital encounter of 01/23/20   Drug abuse screen 6 urine (chem dep)     Status: None   Result Value Ref Range    Amphetamine Qual Urine Negative NEG^Negative    Barbiturates Qual Urine Negative NEG^Negative    Benzodiazepine Qual Urine Negative " NEG^Negative    Cannabinoids Qual Urine Negative NEG^Negative    Cocaine Qual Urine Negative NEG^Negative    Ethanol Qual Urine Negative NEG^Negative    Opiates Qualitative Urine Negative NEG^Negative   CBC with platelets differential     Status: None   Result Value Ref Range    WBC 7.5 4.0 - 11.0 10e9/L    RBC Count 4.47 3.8 - 5.2 10e12/L    Hemoglobin 13.6 11.7 - 15.7 g/dL    Hematocrit 41.1 35.0 - 47.0 %    MCV 92 78 - 100 fl    MCH 30.4 26.5 - 33.0 pg    MCHC 33.1 31.5 - 36.5 g/dL    RDW 12.9 10.0 - 15.0 %    Platelet Count 257 150 - 450 10e9/L    Diff Method Automated Method     % Neutrophils 59.1 %    % Lymphocytes 26.8 %    % Monocytes 10.4 %    % Eosinophils 3.5 %    % Basophils 0.1 %    % Immature Granulocytes 0.1 %    Nucleated RBCs 0 0 /100    Absolute Neutrophil 4.4 1.6 - 8.3 10e9/L    Absolute Lymphocytes 2.0 0.8 - 5.3 10e9/L    Absolute Monocytes 0.8 0.0 - 1.3 10e9/L    Absolute Eosinophils 0.3 0.0 - 0.7 10e9/L    Absolute Basophils 0.0 0.0 - 0.2 10e9/L    Abs Immature Granulocytes 0.0 0 - 0.4 10e9/L    Absolute Nucleated RBC 0.0    Comprehensive metabolic panel     Status: Abnormal   Result Value Ref Range    Sodium 141 133 - 144 mmol/L    Potassium 3.5 3.4 - 5.3 mmol/L    Chloride 110 (H) 94 - 109 mmol/L    Carbon Dioxide 23 20 - 32 mmol/L    Anion Gap 8 3 - 14 mmol/L    Glucose 97 70 - 99 mg/dL    Urea Nitrogen 16 7 - 30 mg/dL    Creatinine 0.88 0.52 - 1.04 mg/dL    GFR Estimate 82 >60 mL/min/[1.73_m2]    GFR Estimate If Black >90 >60 mL/min/[1.73_m2]    Calcium 8.3 (L) 8.5 - 10.1 mg/dL    Bilirubin Total 0.4 0.2 - 1.3 mg/dL    Albumin 3.3 (L) 3.4 - 5.0 g/dL    Protein Total 6.6 (L) 6.8 - 8.8 g/dL    Alkaline Phosphatase 76 40 - 150 U/L    ALT 43 0 - 50 U/L    AST 17 0 - 45 U/L   TSH with free T4 reflex and/or T3 as indicated     Status: None   Result Value Ref Range    TSH 1.53 0.40 - 4.00 mU/L   Magnesium     Status: None   Result Value Ref Range    Magnesium 2.2 1.6 - 2.3 mg/dL   Phosphorus      Status: None   Result Value Ref Range    Phosphorus 3.2 2.5 - 4.5 mg/dL   Lithium level     Status: None   Result Value Ref Range    Lithium Level 0.63 0.60 - 1.20 mmol/L   Vitamin D Deficiency     Status: Abnormal   Result Value Ref Range    Vitamin D Deficiency screening 18 (L) 20 - 75 ug/L

## 2020-12-27 ENCOUNTER — HOSPITAL ENCOUNTER (EMERGENCY)
Facility: CLINIC | Age: 40
Discharge: ANOTHER HOSPITAL | End: 2020-12-27
Attending: EMERGENCY MEDICINE | Admitting: EMERGENCY MEDICINE
Payer: COMMERCIAL

## 2020-12-27 VITALS
DIASTOLIC BLOOD PRESSURE: 64 MMHG | OXYGEN SATURATION: 96 % | HEART RATE: 80 BPM | TEMPERATURE: 97.6 F | SYSTOLIC BLOOD PRESSURE: 112 MMHG | RESPIRATION RATE: 16 BRPM

## 2020-12-27 DIAGNOSIS — Z20.828 CONTACT WITH AND (SUSPECTED) EXPOSURE TO OTHER VIRAL COMMUNICABLE DISEASES: ICD-10-CM

## 2020-12-27 DIAGNOSIS — R45.851 SUICIDAL IDEATION: ICD-10-CM

## 2020-12-27 LAB
AMPHETAMINES UR QL SCN: NEGATIVE
BARBITURATES UR QL: NEGATIVE
BENZODIAZ UR QL: NEGATIVE
CANNABINOIDS UR QL SCN: NEGATIVE
COCAINE UR QL: NEGATIVE
ETHANOL UR QL SCN: NEGATIVE
LABORATORY COMMENT REPORT: NORMAL
OPIATES UR QL SCN: NEGATIVE
SARS-COV-2 RNA SPEC QL NAA+PROBE: NEGATIVE
SPECIMEN SOURCE: NORMAL

## 2020-12-27 PROCEDURE — 99285 EMERGENCY DEPT VISIT HI MDM: CPT | Performed by: EMERGENCY MEDICINE

## 2020-12-27 PROCEDURE — 80307 DRUG TEST PRSMV CHEM ANLYZR: CPT | Performed by: EMERGENCY MEDICINE

## 2020-12-27 PROCEDURE — 120N000001 HC R&B MED SURG/OB

## 2020-12-27 PROCEDURE — C9803 HOPD COVID-19 SPEC COLLECT: HCPCS | Performed by: EMERGENCY MEDICINE

## 2020-12-27 PROCEDURE — 250N000013 HC RX MED GY IP 250 OP 250 PS 637: Performed by: EMERGENCY MEDICINE

## 2020-12-27 PROCEDURE — 80320 DRUG SCREEN QUANTALCOHOLS: CPT | Performed by: EMERGENCY MEDICINE

## 2020-12-27 PROCEDURE — 87635 SARS-COV-2 COVID-19 AMP PRB: CPT | Performed by: EMERGENCY MEDICINE

## 2020-12-27 RX ORDER — ESTRADIOL 2 MG/1
2 TABLET ORAL ONCE
Status: COMPLETED | OUTPATIENT
Start: 2020-12-27 | End: 2020-12-27

## 2020-12-27 RX ORDER — SPIRONOLACTONE 100 MG/1
100 TABLET, FILM COATED ORAL ONCE
Status: COMPLETED | OUTPATIENT
Start: 2020-12-27 | End: 2020-12-27

## 2020-12-27 RX ORDER — LITHIUM CARBONATE 300 MG/1
1200 CAPSULE ORAL DAILY
COMMUNITY

## 2020-12-27 RX ADMIN — LITHIUM CARBONATE 1200 MG: 300 TABLET, EXTENDED RELEASE ORAL at 08:52

## 2020-12-27 RX ADMIN — ESTRADIOL 2 MG: 2 TABLET ORAL at 16:18

## 2020-12-27 RX ADMIN — SPIRONOLACTONE 100 MG: 100 TABLET, FILM COATED ORAL at 21:18

## 2020-12-27 RX ADMIN — ESTRADIOL 2 MG: 2 TABLET ORAL at 21:18

## 2020-12-27 NOTE — ED NOTES
Patient informed of safety measures including behavior masks, video monitoring, bathroom policy, search, and remote use. Patient cooperative and forthcoming with assessment questions, expressed she voluntarily came in to address her thoughts of suicide. Has had history of multiple suicide attempts in the past, agreed to let staff know of safety concerns and condition changes.

## 2020-12-27 NOTE — ED TRIAGE NOTES
Pt states that over the past week has been having increasing thoughts of self harm. Tonight pt attempted to burn hand on stove. Has had suicide attempts in the past while in a hospital setting.

## 2020-12-27 NOTE — ED NOTES
Emergency Department Patient Sign-out       Brief HPI:  This is a 40 year old female signed out to me by Dr. Carey .  See initial ED Provider note for details of the presentation.          Patient is medically cleared for admission to a Behavioral Health unit.      The patient is voluntary, but HOLDABLE.      The patient has not required medication for agitation.    Awaiting Transfer to Mental Health Facility        Significant Events prior to my assuming care: admitted      Exam:   Patient Vitals for the past 24 hrs:   BP Temp Temp src Pulse Resp SpO2   12/27/20 1351 134/67 -- -- -- -- --   12/27/20 0852 (!) 166/65 98  F (36.7  C) Oral 68 16 98 %   12/27/20 0543 138/73 98.6  F (37  C) Oral 85 16 98 %           ED RESULTS:   Results for orders placed or performed during the hospital encounter of 12/27/20 (from the past 24 hour(s))   Asymptomatic SARS-CoV-2 COVID-19 Virus (Coronavirus) by PCR     Status: None    Collection Time: 12/27/20  7:17 AM    Specimen: Nasopharyngeal   Result Value Ref Range    SARS-CoV-2 Virus Specimen Source Nasopharyngeal     SARS-CoV-2 PCR Result NEGATIVE     SARS-CoV-2 PCR Comment (Note)        ED MEDICATIONS:   Medications   estradiol (ESTRACE) tablet 2 mg (has no administration in time range)   lithium ER (LITHOBID) CR tablet 1,200 mg (1,200 mg Oral Given 12/27/20 0852)         Impression:    ICD-10-CM    1. Suicidal ideation  R45.851 Asymptomatic SARS-CoV-2 COVID-19 Virus (Coronavirus) by PCR       Plan:    Pending studies include none.        MD Maye Tam, Sulma CORRIGAN MD  12/27/20 4984
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
## 2023-07-05 ENCOUNTER — TELEPHONE (OUTPATIENT)
Dept: PLASTIC SURGERY | Facility: CLINIC | Age: 43
End: 2023-07-05
Payer: COMMERCIAL

## 2023-07-05 NOTE — CONFIDENTIAL NOTE
Pt LVM re: request for FFS consult. Neilr called back and informed her that we no longer have a surgeon who offers FFS. Neilr emailed list of providers in [place] and [place] to [email or web address].